# Patient Record
Sex: FEMALE | Race: WHITE | NOT HISPANIC OR LATINO | URBAN - METROPOLITAN AREA
[De-identification: names, ages, dates, MRNs, and addresses within clinical notes are randomized per-mention and may not be internally consistent; named-entity substitution may affect disease eponyms.]

---

## 2023-09-18 ENCOUNTER — HOSPITAL ENCOUNTER (OUTPATIENT)
Dept: DATA CONVERSION | Facility: HOSPITAL | Age: 38
Discharge: HOME | End: 2023-09-18
Payer: COMMERCIAL

## 2023-09-18 DIAGNOSIS — E66.9 OBESITY, UNSPECIFIED: ICD-10-CM

## 2023-09-18 DIAGNOSIS — S83.512A SPRAIN OF ANTERIOR CRUCIATE LIGAMENT OF LEFT KNEE, INITIAL ENCOUNTER: ICD-10-CM

## 2023-09-18 DIAGNOSIS — E03.9 HYPOTHYROIDISM, UNSPECIFIED: ICD-10-CM

## 2023-09-18 LAB
ALBUMIN SERPL-MCNC: 4 GM/DL (ref 3.5–5)
ALBUMIN/GLOB SERPL: 1.4 RATIO (ref 1.5–3)
ALP BLD-CCNC: 70 U/L (ref 35–125)
ALT SERPL-CCNC: 17 U/L (ref 5–40)
ANION GAP SERPL CALCULATED.3IONS-SCNC: 19 MMOL/L (ref 0–19)
AST SERPL-CCNC: 23 U/L (ref 5–40)
BASOPHILS # BLD AUTO: 0.21 K/UL (ref 0–0.22)
BASOPHILS NFR BLD AUTO: 2 % (ref 0–1)
BILIRUB SERPL-MCNC: 0.3 MG/DL (ref 0.1–1.2)
BUN SERPL-MCNC: 10 MG/DL (ref 8–25)
BUN/CREAT SERPL: 14.3 RATIO (ref 8–21)
CALCIUM SERPL-MCNC: 9 MG/DL (ref 8.5–10.4)
CHLORIDE SERPL-SCNC: 104 MMOL/L (ref 97–107)
CO2 SERPL-SCNC: 17 MMOL/L (ref 24–31)
CREAT SERPL-MCNC: 0.7 MG/DL (ref 0.4–1.6)
DACRYOCYTES BLD QL SMEAR: ABNORMAL
DEPRECATED RDW RBC AUTO: 62.6 FL (ref 37–54)
DIFFERENTIAL METHOD BLD: MANUAL DIFF
EOSINOPHIL # BLD AUTO: 0.11 K/UL (ref 0–0.45)
EOSINOPHIL NFR BLD: 1 % (ref 0–3)
ERYTHROCYTE [DISTWIDTH] IN BLOOD BY AUTOMATED COUNT: 15.3 % (ref 11.7–15)
GFR SERPL CREATININE-BSD FRML MDRD: 113 ML/MIN/1.73 M2
GLOBULIN SER-MCNC: 2.8 G/DL (ref 1.9–3.7)
GLUCOSE SERPL-MCNC: 38 MG/DL (ref 65–99)
HCT VFR BLD AUTO: 53.2 % (ref 36–44)
HGB BLD-MCNC: 15.4 GM/DL (ref 12–15)
HYPOCHROMIA BLD QL SMEAR: ABNORMAL
LYMPHOCYTES # BLD AUTO: 3.05 K/UL (ref 1.2–3.2)
LYMPHOCYTES NFR BLD MANUAL: 29 % (ref 20–40)
MACROCYTES BLD QL SMEAR: ABNORMAL
MCH RBC QN AUTO: 31.8 PG (ref 26–34)
MCHC RBC AUTO-ENTMCNC: 28.9 % (ref 31–37)
MCV RBC AUTO: 109.7 FL (ref 80–100)
NEUTROPHILS # BLD AUTO: 4.95 K/UL (ref 1.8–7.7)
NEUTROPHILS # BLD AUTO: 7.64 K/UL
NEUTROPHILS # BLD MANUAL: 7.16 10*3/UL
NEUTS BAND # BLD AUTO: 2.21 K/UL
NEUTS BAND BLD QL AUTO: 21 %
NEUTS SEG NFR BLD: 47 % (ref 50–70)
NRBC BLD-RTO: 0 /100 WBC
PLATELET # BLD AUTO: 225 K/UL (ref 150–450)
PLATELET BLD QL SMEAR: ABNORMAL
PMV BLD AUTO: 10.5 CU (ref 7–12.6)
POIKILOCYTOSIS BLD QL SMEAR: ABNORMAL
POTASSIUM SERPL-SCNC: 4.6 MMOL/L (ref 3.4–5.1)
PROT SERPL-MCNC: 6.8 G/DL (ref 5.9–7.9)
RBC # BLD AUTO: 4.85 M/UL (ref 4–4.9)
RBC MORPH BLD: ABNORMAL
SODIUM SERPL-SCNC: 140 MMOL/L (ref 133–145)
WBC # BLD AUTO: 10.5 K/UL (ref 4.5–11)

## 2023-09-19 LAB — PATH REV BLD -IMP: NORMAL

## 2023-09-22 ENCOUNTER — HOSPITAL ENCOUNTER (OUTPATIENT)
Dept: DATA CONVERSION | Facility: HOSPITAL | Age: 38
Discharge: HOME | End: 2023-09-22
Payer: COMMERCIAL

## 2023-09-22 DIAGNOSIS — Z01.818 ENCOUNTER FOR OTHER PREPROCEDURAL EXAMINATION: ICD-10-CM

## 2023-09-22 LAB
ANION GAP SERPL CALCULATED.3IONS-SCNC: 13 MMOL/L (ref 0–19)
BASOPHILS # BLD AUTO: 0.05 K/UL (ref 0–0.22)
BASOPHILS NFR BLD AUTO: 0.5 % (ref 0–1)
BUN SERPL-MCNC: 11 MG/DL (ref 8–25)
BUN/CREAT SERPL: 15.7 RATIO (ref 8–21)
CALCIUM SERPL-MCNC: 9.1 MG/DL (ref 8.5–10.4)
CHLORIDE SERPL-SCNC: 105 MMOL/L (ref 97–107)
CO2 SERPL-SCNC: 20 MMOL/L (ref 24–31)
CREAT SERPL-MCNC: 0.7 MG/DL (ref 0.4–1.6)
DEPRECATED RDW RBC AUTO: 47 FL (ref 37–54)
DIFFERENTIAL METHOD BLD: ABNORMAL
EOSINOPHIL # BLD AUTO: 0.17 K/UL (ref 0–0.45)
EOSINOPHIL NFR BLD: 1.6 % (ref 0–3)
ERYTHROCYTE [DISTWIDTH] IN BLOOD BY AUTOMATED COUNT: 13.5 % (ref 11.7–15)
GFR SERPL CREATININE-BSD FRML MDRD: 113 ML/MIN/1.73 M2
GLUCOSE SERPL-MCNC: 89 MG/DL (ref 65–99)
HCT VFR BLD AUTO: 46 % (ref 36–44)
HGB BLD-MCNC: 15.5 GM/DL (ref 12–15)
IMM GRANULOCYTES # BLD AUTO: 0.04 K/UL (ref 0–0.1)
LYMPHOCYTES # BLD AUTO: 2.93 K/UL (ref 1.2–3.2)
LYMPHOCYTES NFR BLD MANUAL: 27.8 % (ref 20–40)
MCH RBC QN AUTO: 31.7 PG (ref 26–34)
MCHC RBC AUTO-ENTMCNC: 33.7 % (ref 31–37)
MCV RBC AUTO: 94.1 FL (ref 80–100)
MONOCYTES # BLD AUTO: 0.57 K/UL (ref 0–0.8)
MONOCYTES NFR BLD MANUAL: 5.4 % (ref 0–8)
NEUTROPHILS # BLD AUTO: 6.78 K/UL
NEUTROPHILS # BLD AUTO: 6.78 K/UL (ref 1.8–7.7)
NEUTROPHILS.IMMATURE NFR BLD: 0.4 % (ref 0–1)
NEUTS SEG NFR BLD: 64.3 % (ref 50–70)
NRBC BLD-RTO: 0 /100 WBC
PLATELET # BLD AUTO: 200 K/UL (ref 150–450)
PLATELET BLD QL SMEAR: ABNORMAL
PMV BLD AUTO: 11.7 CU (ref 7–12.6)
POTASSIUM SERPL-SCNC: 4.4 MMOL/L (ref 3.4–5.1)
RBC # BLD AUTO: 4.89 M/UL (ref 4–4.9)
RBC MORPH BLD: ABNORMAL
SODIUM SERPL-SCNC: 138 MMOL/L (ref 133–145)
VIT B12 SERPL-MCNC: 376 PG/ML (ref 211–946)
WBC # BLD AUTO: 10.5 K/UL (ref 4.5–11)
WBC MORPH BLD: ABNORMAL

## 2023-09-27 ENCOUNTER — HOSPITAL ENCOUNTER (OUTPATIENT)
Dept: DATA CONVERSION | Facility: HOSPITAL | Age: 38
Discharge: HOME | End: 2023-09-27
Payer: COMMERCIAL

## 2023-09-27 DIAGNOSIS — E66.9 OBESITY, UNSPECIFIED: ICD-10-CM

## 2023-09-27 DIAGNOSIS — F17.210 NICOTINE DEPENDENCE, CIGARETTES, UNCOMPLICATED: ICD-10-CM

## 2023-09-27 DIAGNOSIS — E03.9 HYPOTHYROIDISM, UNSPECIFIED: ICD-10-CM

## 2023-09-27 DIAGNOSIS — S83.512A SPRAIN OF ANTERIOR CRUCIATE LIGAMENT OF LEFT KNEE, INITIAL ENCOUNTER: ICD-10-CM

## 2023-09-27 LAB — HCG UR QL: NEGATIVE

## 2024-04-12 ENCOUNTER — OFFICE VISIT (OUTPATIENT)
Dept: PRIMARY CARE | Facility: CLINIC | Age: 39
End: 2024-04-12
Payer: COMMERCIAL

## 2024-04-12 VITALS
WEIGHT: 250 LBS | HEIGHT: 63 IN | OXYGEN SATURATION: 97 % | BODY MASS INDEX: 44.3 KG/M2 | HEART RATE: 89 BPM | TEMPERATURE: 97.4 F | DIASTOLIC BLOOD PRESSURE: 70 MMHG | SYSTOLIC BLOOD PRESSURE: 110 MMHG | RESPIRATION RATE: 16 BRPM

## 2024-04-12 DIAGNOSIS — L30.9 CHRONIC DERMATITIS OF HANDS: Primary | ICD-10-CM

## 2024-04-12 DIAGNOSIS — D22.9 NEVUS: ICD-10-CM

## 2024-04-12 DIAGNOSIS — G43.809 OTHER MIGRAINE WITHOUT STATUS MIGRAINOSUS, NOT INTRACTABLE: ICD-10-CM

## 2024-04-12 PROCEDURE — 99214 OFFICE O/P EST MOD 30 MIN: CPT | Performed by: FAMILY MEDICINE

## 2024-04-12 PROCEDURE — 3008F BODY MASS INDEX DOCD: CPT | Performed by: FAMILY MEDICINE

## 2024-04-12 RX ORDER — KETOCONAZOLE 20 MG/G
CREAM TOPICAL 2 TIMES DAILY
Qty: 60 G | Refills: 2 | Status: SHIPPED | OUTPATIENT
Start: 2024-04-12 | End: 2025-04-12

## 2024-04-12 RX ORDER — SUMATRIPTAN SUCCINATE 100 MG/1
100 TABLET ORAL ONCE AS NEEDED
Qty: 9 TABLET | Refills: 5 | Status: SHIPPED | OUTPATIENT
Start: 2024-04-12 | End: 2025-04-12

## 2024-04-12 RX ORDER — ALCLOMETASONE DIPROPIONATE 0.5 MG/G
CREAM TOPICAL 2 TIMES DAILY
Qty: 45 G | Refills: 2 | Status: SHIPPED | OUTPATIENT
Start: 2024-04-12

## 2024-04-12 RX ORDER — LEVONORGESTREL 52 MG/1
INTRAUTERINE DEVICE INTRAUTERINE
COMMUNITY

## 2024-04-12 ASSESSMENT — ENCOUNTER SYMPTOMS
DEPRESSION: 0
MUSCULOSKELETAL NEGATIVE: 1
CARDIOVASCULAR NEGATIVE: 1
LOSS OF SENSATION IN FEET: 0
RESPIRATORY NEGATIVE: 1
NEUROLOGICAL NEGATIVE: 1
CONSTITUTIONAL NEGATIVE: 1
OCCASIONAL FEELINGS OF UNSTEADINESS: 0
GASTROINTESTINAL NEGATIVE: 1

## 2024-04-12 ASSESSMENT — PATIENT HEALTH QUESTIONNAIRE - PHQ9
1. LITTLE INTEREST OR PLEASURE IN DOING THINGS: NOT AT ALL
2. FEELING DOWN, DEPRESSED OR HOPELESS: NOT AT ALL
SUM OF ALL RESPONSES TO PHQ9 QUESTIONS 1 AND 2: 0

## 2024-04-12 ASSESSMENT — COLUMBIA-SUICIDE SEVERITY RATING SCALE - C-SSRS
1. IN THE PAST MONTH, HAVE YOU WISHED YOU WERE DEAD OR WISHED YOU COULD GO TO SLEEP AND NOT WAKE UP?: NO
6. HAVE YOU EVER DONE ANYTHING, STARTED TO DO ANYTHING, OR PREPARED TO DO ANYTHING TO END YOUR LIFE?: NO
2. HAVE YOU ACTUALLY HAD ANY THOUGHTS OF KILLING YOURSELF?: NO

## 2024-04-12 ASSESSMENT — PAIN SCALES - GENERAL: PAINLEVEL: 8

## 2024-04-12 NOTE — PROGRESS NOTES
"Subjective   Patient ID: Shana Pierce is a 38 y.o. female who presents for Mass (Pt is here with complaints of bumps on her left hand and has a mole on her right side of her face and one on her neck that she would like evaluated for removal.).    HPI   Lesion on right cheek has been there for years but somewhat larger recently.  Also left neck unchanged for years.   Review of Systems   Constitutional: Negative.    HENT: Negative.     Respiratory: Negative.     Cardiovascular: Negative.    Gastrointestinal: Negative.    Genitourinary: Negative.    Musculoskeletal: Negative.    Neurological: Negative.        Objective   /70 (BP Location: Left arm, Patient Position: Sitting, BP Cuff Size: Large adult)   Pulse 89   Temp 36.3 °C (97.4 °F) (Temporal)   Resp 16   Ht 1.6 m (5' 3\")   Wt 113 kg (250 lb)   SpO2 97%   BMI 44.29 kg/m²     Physical Exam  Constitutional:       General: She is not in acute distress.     Appearance: Normal appearance.   Cardiovascular:      Rate and Rhythm: Normal rate and regular rhythm.      Heart sounds: No murmur heard.  Pulmonary:      Breath sounds: Normal breath sounds. No wheezing.   Skin:     Comments: Left palm with some peeling and scale.  No maceration or peeling of feet.   Right cheek with 1.2 oval raised pinkish lesion with some brown section of superior portion.   Similar flesh colored lesion of left neck.    Neurological:      Mental Status: She is alert.         Assessment/Plan   Problem List Items Addressed This Visit    None  Visit Diagnoses         Codes    Chronic dermatitis of hands    -  Primary L30.9    Relevant Medications    ketoconazole (NIZOral) 2 % cream    alclometasone (Aclovate) 0.05 % cream    Other migraine without status migrainosus, not intractable     G43.809    Relevant Medications    SUMAtriptan (Imitrex) 100 mg tablet    Nevus     D22.9          Follow up for shave excision near future     "

## 2024-04-16 ENCOUNTER — APPOINTMENT (OUTPATIENT)
Dept: PRIMARY CARE | Facility: CLINIC | Age: 39
End: 2024-04-16
Payer: COMMERCIAL

## 2024-05-06 ENCOUNTER — HOSPITAL ENCOUNTER (EMERGENCY)
Facility: HOSPITAL | Age: 39
Discharge: HOME | End: 2024-05-06
Payer: COMMERCIAL

## 2024-05-06 VITALS
RESPIRATION RATE: 18 BRPM | SYSTOLIC BLOOD PRESSURE: 113 MMHG | DIASTOLIC BLOOD PRESSURE: 74 MMHG | OXYGEN SATURATION: 99 % | HEIGHT: 66 IN | WEIGHT: 239 LBS | HEART RATE: 82 BPM | TEMPERATURE: 98.6 F | BODY MASS INDEX: 38.41 KG/M2

## 2024-05-06 DIAGNOSIS — H02.89 PAIN OF RIGHT EYELID: Primary | ICD-10-CM

## 2024-05-06 PROCEDURE — 99281 EMR DPT VST MAYX REQ PHY/QHP: CPT

## 2024-05-06 ASSESSMENT — COLUMBIA-SUICIDE SEVERITY RATING SCALE - C-SSRS
6. HAVE YOU EVER DONE ANYTHING, STARTED TO DO ANYTHING, OR PREPARED TO DO ANYTHING TO END YOUR LIFE?: NO
1. IN THE PAST MONTH, HAVE YOU WISHED YOU WERE DEAD OR WISHED YOU COULD GO TO SLEEP AND NOT WAKE UP?: NO
2. HAVE YOU ACTUALLY HAD ANY THOUGHTS OF KILLING YOURSELF?: NO

## 2024-05-06 ASSESSMENT — VISUAL ACUITY
OS: 20/20
OU: 20/20
OD: 20/25

## 2024-05-06 NOTE — DISCHARGE INSTRUCTIONS
You were seen in the emergency department today for evaluation of pain to the right eyelid.  Suspect your pain was due to a ingrown hair.  We recommend you continue to use warm compresses as needed to help with any pain or discomfort.  Follow-up with primary care physician outpatient within the next 1 to 2 days.  Return to the emergency department at anytime with any new or worsening symptoms.

## 2024-05-06 NOTE — ED PROVIDER NOTES
HPI   Chief Complaint   Patient presents with    Eye Problem     Stye in right eye. Blurred vision, itching, pain. Started on Thursday, getting worse ever since. Pt is wearing contacts.       Patient is a 38-year-old female presenting to the emergency department for evaluation of bump to the right eye.  Patient states she noticed the bump on Thursday.  She states she thought it was a stye.  She states she has placed warm compresses on the right eyelid with minimal relief in symptoms.  She denies any trauma to her actual eyeball or pain with eye movements.  She denies any change in vision.  She denies any headaches, numbness, tingling.                          [place] Coma Scale Score: 15                     Patient History   Past Medical History:   Diagnosis Date    Bee allergy status 2016    Bee sting allergy    Complex regional pain syndrome I of unspecified upper limb 2015    RSD upper limb    Impaired fasting glucose 2015    Elevated fasting glucose     Past Surgical History:   Procedure Laterality Date     SECTION, CLASSIC  2015     Section    CHOLECYSTECTOMY  2015    Cholecystectomy Laparoscopic     No family history on file.  Social History     Tobacco Use    Smoking status: Every Day     Current packs/day: 0.25     Types: Cigarettes    Smokeless tobacco: Never   Substance Use Topics    Alcohol use: Never    Drug use: Never       Physical Exam   ED Triage Vitals [24 1540]   Temperature Heart Rate Respirations BP   37 °C (98.6 °F) 82 18 113/74      Pulse Ox Temp Source Heart Rate Source Patient Position   99 % Oral -- Sitting      BP Location FiO2 (%)     Left arm --       Physical Exam  Vitals and nursing note reviewed.   Constitutional:       General: She is not in acute distress.     Appearance: Normal appearance. She is not ill-appearing or toxic-appearing.   HENT:      Head: Normocephalic and atraumatic.      Nose: Nose normal.      Mouth/Throat:       Mouth: Mucous membranes are moist.   Eyes:      General:         Right eye: No foreign body, discharge or hordeolum.         Left eye: No foreign body, discharge or hordeolum.      Extraocular Movements: Extraocular movements intact.      Conjunctiva/sclera: Conjunctivae normal.      Right eye: Right conjunctiva is not injected.      Left eye: Left conjunctiva is not injected.      Pupils: Pupils are equal, round, and reactive to light.      Comments: Small papule noted to the right upper eyelid in line with the eyelashes.  There appears to be a white center on the papule with a hair in the middle of the papule.    Musculoskeletal:         General: Normal range of motion.   Skin:     General: Skin is warm and dry.   Neurological:      General: No focal deficit present.      Mental Status: She is alert and oriented to person, place, and time.   Psychiatric:         Mood and Affect: Mood normal.         Behavior: Behavior normal.         ED Course & MDM   Diagnoses as of 05/06/24 1705   Pain of right eyelid       Medical Decision Making  **Disclaimer parts of this chart have been completed using voice recognition software. Please excuse any errors of transcription.     Evaluated this patient independently and my supervising physician was available for consultation.    HPI: Detailed above.    Exam: A medically appropriate exam performed, outlined above, given the known history and presentation.    History obtained from: Patient    EMERGENCY DEPARTMENT COURSE and DIFFERENTIAL DIAGNOSIS/MDM:  Patient is a 38-year-old female presenting to the emergency department for evaluation of bump to the upper right eyelid.  On physical exam vital signs stable patient is in no acute distress.  She has a papule with a central white dot noted to the external upper lid in line with the upper eyelashes which appears to be similar to an ingrown hair.  Pressure was placed on the papule and hair was exposed.  Hair was pulled with tweezers  "and patient got immediate relief in the right eyelid.  She denies any changes in vision.  She has no conjunctival injection or sign of foreign body in the eye.  Suspect most of her pain was coming from the papule on her right eyelid which was an ingrown hair.  She was given bacitracin for the papule as requested.  She was discharged in stable condition.  She will follow-up with primary care physician outpatient within the next 1 to 2 days.  She will return to the emergency department any new or worsening symptoms.      Vitals:    Vitals:    05/06/24 1540   BP: 113/74   BP Location: Left arm   Patient Position: Sitting   Pulse: 82   Resp: 18   Temp: 37 °C (98.6 °F)   TempSrc: Oral   SpO2: 99%   Weight: 108 kg (239 lb)   Height: 1.676 m (5' 6\")     History Limited by:    None    Independent history obtained from:    None      External records reviewed:    None      Diagnostics interpreted by me:    None      Discussions with other clinicians:    None      Chronic conditions impacting care:    None      Social determinants of health affecting care:    None    Diagnostic tests considered but not performed: None    ED Medications managed:    Medications - No data to display      Prescription drugs considered:    None        Procedure  Procedures     Tammie Gates PA-C  05/06/24 1705    "

## 2024-05-09 ENCOUNTER — OFFICE VISIT (OUTPATIENT)
Dept: PRIMARY CARE | Facility: CLINIC | Age: 39
End: 2024-05-09
Payer: COMMERCIAL

## 2024-05-09 VITALS
WEIGHT: 248 LBS | BODY MASS INDEX: 41.32 KG/M2 | DIASTOLIC BLOOD PRESSURE: 86 MMHG | OXYGEN SATURATION: 99 % | RESPIRATION RATE: 16 BRPM | HEART RATE: 94 BPM | HEIGHT: 65 IN | SYSTOLIC BLOOD PRESSURE: 118 MMHG | TEMPERATURE: 97.3 F

## 2024-05-09 DIAGNOSIS — Z00.00 ROUTINE GENERAL MEDICAL EXAMINATION AT A HEALTH CARE FACILITY: Primary | ICD-10-CM

## 2024-05-09 DIAGNOSIS — Z72.0 TOBACCO ABUSE: ICD-10-CM

## 2024-05-09 DIAGNOSIS — J45.909 UNCOMPLICATED ASTHMA, UNSPECIFIED ASTHMA SEVERITY, UNSPECIFIED WHETHER PERSISTENT (HHS-HCC): ICD-10-CM

## 2024-05-09 DIAGNOSIS — E66.01 CLASS 3 SEVERE OBESITY DUE TO EXCESS CALORIES WITHOUT SERIOUS COMORBIDITY WITH BODY MASS INDEX (BMI) OF 40.0 TO 44.9 IN ADULT (MULTI): ICD-10-CM

## 2024-05-09 DIAGNOSIS — Z01.818 PREOPERATIVE EXAMINATION: ICD-10-CM

## 2024-05-09 LAB
ALBUMIN SERPL-MCNC: 4 G/DL (ref 3.5–5)
ALP BLD-CCNC: 88 U/L (ref 35–125)
ALT SERPL-CCNC: 14 U/L (ref 5–40)
ANION GAP SERPL CALC-SCNC: 14 MMOL/L
AST SERPL-CCNC: 11 U/L (ref 5–40)
BASOPHILS # BLD AUTO: 0.04 X10*3/UL (ref 0–0.1)
BASOPHILS NFR BLD AUTO: 0.5 %
BILIRUB SERPL-MCNC: 0.2 MG/DL (ref 0.1–1.2)
BUN SERPL-MCNC: 14 MG/DL (ref 8–25)
CALCIUM SERPL-MCNC: 8.9 MG/DL (ref 8.5–10.4)
CHLORIDE SERPL-SCNC: 108 MMOL/L (ref 97–107)
CHOLEST SERPL-MCNC: 188 MG/DL (ref 133–200)
CHOLEST/HDLC SERPL: 4.9 {RATIO}
CO2 SERPL-SCNC: 21 MMOL/L (ref 24–31)
CREAT SERPL-MCNC: 0.7 MG/DL (ref 0.4–1.6)
EGFRCR SERPLBLD CKD-EPI 2021: >90 ML/MIN/1.73M*2
EOSINOPHIL # BLD AUTO: 0.18 X10*3/UL (ref 0–0.7)
EOSINOPHIL NFR BLD AUTO: 2.2 %
ERYTHROCYTE [DISTWIDTH] IN BLOOD BY AUTOMATED COUNT: 13.2 % (ref 11.5–14.5)
GLUCOSE SERPL-MCNC: 108 MG/DL (ref 65–99)
HCT VFR BLD AUTO: 45.8 % (ref 36–46)
HDLC SERPL-MCNC: 38 MG/DL
HGB BLD-MCNC: 15 G/DL (ref 12–16)
IMM GRANULOCYTES # BLD AUTO: 0.02 X10*3/UL (ref 0–0.7)
IMM GRANULOCYTES NFR BLD AUTO: 0.2 % (ref 0–0.9)
LDLC SERPL CALC-MCNC: 132 MG/DL (ref 65–130)
LYMPHOCYTES # BLD AUTO: 1.98 X10*3/UL (ref 1.2–4.8)
LYMPHOCYTES NFR BLD AUTO: 24.4 %
MCH RBC QN AUTO: 31.6 PG (ref 26–34)
MCHC RBC AUTO-ENTMCNC: 32.8 G/DL (ref 32–36)
MCV RBC AUTO: 97 FL (ref 80–100)
MONOCYTES # BLD AUTO: 0.46 X10*3/UL (ref 0.1–1)
MONOCYTES NFR BLD AUTO: 5.7 %
NEUTROPHILS # BLD AUTO: 5.45 X10*3/UL (ref 1.2–7.7)
NEUTROPHILS NFR BLD AUTO: 67 %
NRBC BLD-RTO: 0 /100 WBCS (ref 0–0)
PLATELET # BLD AUTO: 218 X10*3/UL (ref 150–450)
POTASSIUM SERPL-SCNC: 4.3 MMOL/L (ref 3.4–5.1)
PROT SERPL-MCNC: 6.5 G/DL (ref 5.9–7.9)
RBC # BLD AUTO: 4.74 X10*6/UL (ref 4–5.2)
SODIUM SERPL-SCNC: 143 MMOL/L (ref 133–145)
TRIGL SERPL-MCNC: 90 MG/DL (ref 40–150)
TSH SERPL DL<=0.05 MIU/L-ACNC: 3.39 MIU/L (ref 0.27–4.2)
WBC # BLD AUTO: 8.1 X10*3/UL (ref 4.4–11.3)

## 2024-05-09 PROCEDURE — 85025 COMPLETE CBC W/AUTO DIFF WBC: CPT | Performed by: FAMILY MEDICINE

## 2024-05-09 PROCEDURE — 36415 COLL VENOUS BLD VENIPUNCTURE: CPT | Performed by: FAMILY MEDICINE

## 2024-05-09 PROCEDURE — 99395 PREV VISIT EST AGE 18-39: CPT | Performed by: FAMILY MEDICINE

## 2024-05-09 PROCEDURE — 99213 OFFICE O/P EST LOW 20 MIN: CPT | Performed by: FAMILY MEDICINE

## 2024-05-09 PROCEDURE — 80053 COMPREHEN METABOLIC PANEL: CPT | Performed by: FAMILY MEDICINE

## 2024-05-09 PROCEDURE — 84443 ASSAY THYROID STIM HORMONE: CPT | Performed by: FAMILY MEDICINE

## 2024-05-09 PROCEDURE — 3008F BODY MASS INDEX DOCD: CPT | Performed by: FAMILY MEDICINE

## 2024-05-09 PROCEDURE — 80061 LIPID PANEL: CPT | Performed by: FAMILY MEDICINE

## 2024-05-09 RX ORDER — VARENICLINE TARTRATE 0.5 (11)-1
KIT ORAL
Qty: 53 EACH | Refills: 0 | Status: SHIPPED | OUTPATIENT
Start: 2024-05-09 | End: 2024-06-08

## 2024-05-09 ASSESSMENT — PROMIS GLOBAL HEALTH SCALE
RATE_QUALITY_OF_LIFE: GOOD
CARRYOUT_PHYSICAL_ACTIVITIES: MODERATELY
RATE_SOCIAL_SATISFACTION: GOOD
RATE_GENERAL_HEALTH: GOOD
CARRYOUT_SOCIAL_ACTIVITIES: GOOD
RATE_AVERAGE_PAIN: 8
EMOTIONAL_PROBLEMS: SOMETIMES
RATE_MENTAL_HEALTH: GOOD
RATE_PHYSICAL_HEALTH: GOOD
RATE_AVERAGE_FATIGUE: MODERATE

## 2024-05-09 ASSESSMENT — ENCOUNTER SYMPTOMS
OCCASIONAL FEELINGS OF UNSTEADINESS: 0
LOSS OF SENSATION IN FEET: 0
CARDIOVASCULAR NEGATIVE: 1
CONSTITUTIONAL NEGATIVE: 1
NEUROLOGICAL NEGATIVE: 1
MUSCULOSKELETAL NEGATIVE: 1
DEPRESSION: 0
GASTROINTESTINAL NEGATIVE: 1
RESPIRATORY NEGATIVE: 1

## 2024-05-09 ASSESSMENT — PAIN SCALES - GENERAL: PAINLEVEL: 8

## 2024-05-09 ASSESSMENT — PATIENT HEALTH QUESTIONNAIRE - PHQ9
SUM OF ALL RESPONSES TO PHQ9 QUESTIONS 1 AND 2: 0
1. LITTLE INTEREST OR PLEASURE IN DOING THINGS: NOT AT ALL
2. FEELING DOWN, DEPRESSED OR HOPELESS: NOT AT ALL

## 2024-05-09 NOTE — PROGRESS NOTES
"Subjective   Patient ID: Shana Pierce is a 38 y.o. female who presents for Annual Exam and Pre-op Exam (Pt is here for pe and fbw. Pt also needs surgical clearance for left knee scope.).    HPI she is having left knee arthroscopy on 5.29.  she had acl repair of this knee in Sept of 2023.  Still with pain.    She continues to smoke  Review of Systems   Constitutional: Negative.    HENT: Negative.     Respiratory: Negative.     Cardiovascular: Negative.    Gastrointestinal: Negative.    Genitourinary: Negative.    Musculoskeletal: Negative.    Neurological: Negative.        Objective   /86 (BP Location: Left arm, Patient Position: Sitting, BP Cuff Size: Adult)   Pulse 94   Temp 36.3 °C (97.3 °F) (Temporal)   Resp 16   Ht 1.651 m (5' 5\")   Wt 112 kg (248 lb)   SpO2 99%   BMI 41.27 kg/m²     Physical Exam  Vitals and nursing note reviewed.   Constitutional:       General: She is not in acute distress.  HENT:      Right Ear: Tympanic membrane and ear canal normal.      Left Ear: Tympanic membrane and ear canal normal.      Nose: Nose normal. No rhinorrhea.      Mouth/Throat:      Pharynx: Oropharynx is clear. No oropharyngeal exudate or posterior oropharyngeal erythema.      Comments: Dentition wnl  Eyes:      Extraocular Movements: Extraocular movements intact.      Conjunctiva/sclera: Conjunctivae normal.      Pupils: Pupils are equal, round, and reactive to light.   Neck:      Vascular: No carotid bruit.   Cardiovascular:      Rate and Rhythm: Normal rate and regular rhythm.      Heart sounds: Normal heart sounds. No murmur heard.  Pulmonary:      Breath sounds: Normal breath sounds. No wheezing or rhonchi.   Abdominal:      General: Bowel sounds are normal. There is no distension.      Palpations: Abdomen is soft. There is no mass.      Tenderness: There is no abdominal tenderness. There is no guarding or rebound.      Hernia: No hernia is present.   Musculoskeletal:         General: No swelling or " tenderness. Normal range of motion.      Cervical back: Normal range of motion and neck supple.   Lymphadenopathy:      Cervical: No cervical adenopathy.   Skin:     General: Skin is warm.      Findings: No rash.   Neurological:      General: No focal deficit present.      Mental Status: She is alert.         Assessment/Plan   Problem List Items Addressed This Visit             ICD-10-CM    Uncomplicated asthma, unspecified asthma severity, unspecified whether persistent ([facility]) J45.909     Other Visit Diagnoses         Codes    Routine general medical examination at a health care facility    -  Primary Z00.00    Relevant Orders    CBC and Auto Differential    Comprehensive Metabolic Panel    TSH with reflex to Free T4 if abnormal    Lipid Panel    Preoperative examination     Z01.818    Relevant Orders    CBC and Auto Differential    Comprehensive Metabolic Panel    TSH with reflex to Free T4 if abnormal    Lipid Panel    Class 3 severe obesity due to excess calories without serious comorbidity with body mass index (BMI) of 40.0 to 44.9 in adult (Multi)     E66.01, Z68.41    Relevant Orders    CBC and Auto Differential    Comprehensive Metabolic Panel    TSH with reflex to Free T4 if abnormal    Lipid Panel    Tobacco abuse     Z72.0    Relevant Medications    varenicline (Chantix Starting Month Box) 0.5 mg (11)- 1 mg (42) tablet        Disc better diet/increased activity  Discussed smoking cessation and rx Chantix given and discussed.

## 2024-05-22 ENCOUNTER — PRE-ADMISSION TESTING (OUTPATIENT)
Dept: PREADMISSION TESTING | Facility: HOSPITAL | Age: 39
End: 2024-05-22
Payer: COMMERCIAL

## 2024-05-22 ENCOUNTER — ANESTHESIA EVENT (OUTPATIENT)
Dept: OPERATING ROOM | Facility: HOSPITAL | Age: 39
End: 2024-05-22
Payer: COMMERCIAL

## 2024-05-22 VITALS — WEIGHT: 239 LBS | HEIGHT: 66 IN | BODY MASS INDEX: 38.41 KG/M2

## 2024-05-22 DIAGNOSIS — Z29.9 PROPHYLACTIC MEASURE: Primary | ICD-10-CM

## 2024-05-22 PROBLEM — E66.9 CLASS 2 OBESITY IN ADULT: Status: ACTIVE | Noted: 2024-05-22

## 2024-05-22 PROBLEM — Z98.890 PONV (POSTOPERATIVE NAUSEA AND VOMITING): Status: ACTIVE | Noted: 2024-05-22

## 2024-05-22 PROBLEM — E66.812 CLASS 2 OBESITY IN ADULT: Status: ACTIVE | Noted: 2024-05-22

## 2024-05-22 PROBLEM — T88.59XA DELAYED EMERGENCE FROM ANESTHESIA: Status: ACTIVE | Noted: 2024-05-22

## 2024-05-22 PROBLEM — Z86.69 HISTORY OF MIGRAINE HEADACHES: Status: ACTIVE | Noted: 2024-05-22

## 2024-05-22 PROBLEM — R11.2 PONV (POSTOPERATIVE NAUSEA AND VOMITING): Status: ACTIVE | Noted: 2024-05-22

## 2024-05-22 RX ORDER — CHLORHEXIDINE GLUCONATE 40 MG/ML
SOLUTION TOPICAL
Qty: 473 ML | Refills: 0 | Status: SHIPPED | OUTPATIENT
Start: 2024-05-22 | End: 2024-05-24 | Stop reason: HOSPADM

## 2024-05-22 ASSESSMENT — DUKE ACTIVITY SCORE INDEX (DASI)
CAN YOU WALK A BLOCK OR TWO ON LEVEL GROUND: YES
CAN YOU WALK INDOORS, SUCH AS AROUND YOUR HOUSE: YES
CAN YOU RUN A SHORT DISTANCE: NO
CAN YOU HAVE SEXUAL RELATIONS: YES
CAN YOU DO LIGHT WORK AROUND THE HOUSE LIKE DUSTING OR WASHING DISHES: YES
CAN YOU DO HEAVY WORK AROUND THE HOUSE LIKE SCRUBBING FLOORS OR LIFTING AND MOVING HEAVY FURNITURE: YES
CAN YOU PARTICIPATE IN STRENOUS SPORTS LIKE SWIMMING, SINGLES TENNIS, FOOTBALL, BASKETBALL, OR SKIING: NO
TOTAL_SCORE: 42.7
CAN YOU CLIMB A FLIGHT OF STAIRS OR WALK UP A HILL: YES
CAN YOU TAKE CARE OF YOURSELF (EAT, DRESS, BATHE, OR USE TOILET): YES
CAN YOU DO MODERATE WORK AROUND THE HOUSE LIKE VACUUMING, SWEEPING FLOORS OR CARRYING GROCERIES: YES
CAN YOU DO YARD WORK LIKE RAKING LEAVES, WEEDING OR PUSHING A MOWER: YES
DASI METS SCORE: 8
CAN YOU PARTICIPATE IN MODERATE RECREATIONAL ACTIVITIES LIKE GOLF, BOWLING, DANCING, DOUBLES TENNIS OR THROWING A BASEBALL OR FOOTBALL: YES

## 2024-05-22 NOTE — PREPROCEDURE INSTRUCTIONS
Medication List            Accurate as of May 22, 2024  8:23 AM. Always use your most recent med list.                alclometasone 0.05 % cream  Commonly known as: Aclovate  Apply topically 2 times a day.  Medication Adjustments for Surgery: Continue until night before surgery     chlorhexidine 4 % external liquid  Commonly known as: Hibiclens  Apply to entire body focusing on surgical area, avoiding genitals, wash an rinse thoroughly for 2 days leading up to surgery and morning of  Medication Adjustments for Surgery: Take morning of surgery with sip of water, no other fluids     ketoconazole 2 % cream  Commonly known as: NIZOral  Apply topically 2 times a day.  Medication Adjustments for Surgery: Continue until night before surgery     Mirena 21 mcg/24 hr (8 yrs) 52 mg IUD  Generic drug: levonorgestrel     SUMAtriptan 100 mg tablet  Commonly known as: Imitrex  Take 1 tablet (100 mg) by mouth 1 time if needed for migraine.  Medication Adjustments for Surgery: Continue until night before surgery     varenicline 0.5 mg (11)- 1 mg (42) tablet  Commonly known as: Chantix Starting Month Box  Take 0.5 mg by mouth 2 times a day AND 1 mg 2 times a day.  Medication Adjustments for Surgery: Continue until night before surgery                              NPO Instructions:    Do not eat any food after midnight the night before your surgery/procedure.  May have clears up to 3 hours preop. Water, Black coffee, tea, gatorade, and clear soda. Then nothing by mouth 3 hours prior to procedure. No gum, candy, mints or smoking.      Additional Instructions:     Review your medication instructions, take indicated medications  Wear  comfortable loose fitting clothing  All jewelry and valuables should be left at home    Park in back of hospital by ER. Come up to Second floor-Outpt dept to check in.  Bring Photo ID and Insurance card,   You MUST have a  with you.  No more than 2 visitors with you please.      If you get ill at  all before your procedure- CALL YOUR DOCTOR/SURGEON.  We want you in the best shape that is possible. Any sickness might lead to your procedure being delayed.      Call Outpatient dept at [phone] the night before your procedure (Friday for Monday procedure), between 1-3 pm.     Chlorhexidine soap was called into your pharmacy- use it Thurs (5/23 and 5/24) and Friday morning. Instruction sheet will be emailed to you.

## 2024-05-22 NOTE — ANESTHESIA PREPROCEDURE EVALUATION
Patient: Shana Pierce    Procedure Information       Date/Time: 24 1000    Procedure: Arthroscopy Knee (Left: Knee)    Location: [facility] / [facility]    Surgeons: Elias Weathers, DO            Relevant Problems   Anesthesia   (+) Delayed emergence from anesthesia   (+) PONV (postoperative nausea and vomiting)      Cardiac (within normal limits)      Pulmonary   (+) Uncomplicated asthma, unspecified asthma severity, unspecified whether persistent ([facility])      Neuro   (+) History of migraine headaches      GI (within normal limits)      /Renal (within normal limits)      Liver (within normal limits)      Endocrine   (+) Class 2 obesity in adult      Hematology (within normal limits)      Musculoskeletal (within normal limits)      HEENT (within normal limits)      ID (within normal limits)      Skin (within normal limits)      GYN (within normal limits)       Clinical information reviewed:   Tobacco  Allergies  Meds   Med Hx  Surg Hx              Chart reviewed. No clearances ordered.    There were no vitals filed for this visit.    Past Surgical History:   Procedure Laterality Date     SECTION, CLASSIC  2015     Section    CHOLECYSTECTOMY  2015    Cholecystectomy Laparoscopic    KNEE SURGERY Left 2023    pt had acl repair     Past Medical History:   Diagnosis Date    Asthma ([facility])     Bee allergy status 2016    Bee sting allergy    Complex regional pain syndrome I of unspecified upper limb 2015    RSD upper limb    Delayed emergence from general anesthesia     Impaired fasting glucose 2015    Elevated fasting glucose    PONV (postoperative nausea and vomiting)        Current Outpatient Medications:     alclometasone (Aclovate) 0.05 % cream, Apply topically 2 times a day., Disp: 45 g, Rfl: 2    ketoconazole (NIZOral) 2 % cream, Apply topically 2 times a day., Disp: 60 g, Rfl: 2    SUMAtriptan (Imitrex) 100 mg tablet, Take 1 tablet (100  mg) by mouth 1 time if needed for migraine., Disp: 9 tablet, Rfl: 5    chlorhexidine (Hibiclens) 4 % external liquid, Apply to entire body focusing on surgical area, avoiding genitals, wash an rinse thoroughly for 2 days leading up to surgery and morning of, Disp: 473 mL, Rfl: 0    levonorgestrel (Mirena) 21 mcg/24 hours (8 yrs) 52 mg IUD, , Disp: , Rfl:     varenicline (Chantix Starting Month Box) 0.5 mg (11)- 1 mg (42) tablet, Take 0.5 mg by mouth 2 times a day AND 1 mg 2 times a day., Disp: 53 each, Rfl: 0  Prior to Admission medications    Medication Sig Start Date End Date Taking? Authorizing Provider   alclometasone (Aclovate) 0.05 % cream Apply topically 2 times a day. 4/12/24  Yes Russ Lopez, DO   ketoconazole (NIZOral) 2 % cream Apply topically 2 times a day. 4/12/24 4/12/25 Yes Russ Lopez DO   SUMAtriptan (Imitrex) 100 mg tablet Take 1 tablet (100 mg) by mouth 1 time if needed for migraine. 4/12/24 4/12/25 Yes Russ Lopez,    chlorhexidine (Hibiclens) 4 % external liquid Apply to entire body focusing on surgical area, avoiding genitals, wash an rinse thoroughly for 2 days leading up to surgery and morning of 5/22/24   Nathaniel Flores PA-C   levonorgestrel (Mirena) 21 mcg/24 hours (8 yrs) 52 mg IUD     Historical Provider, MD   varenicline (Chantix Starting Month Box) 0.5 mg (11)- 1 mg (42) tablet Take 0.5 mg by mouth 2 times a day AND 1 mg 2 times a day. 5/9/24 6/8/24  Russ Lopez DO     Allergies   Allergen Reactions    Penicillins Rash and Hives    Cortisone Swelling    Latex Rash     Social History     Tobacco Use    Smoking status: Every Day     Current packs/day: 0.25     Average packs/day: 0.3 packs/day for 24.4 years (6.1 ttl pk-yrs)     Types: Cigarettes     Start date: 2000    Smokeless tobacco: Never   Substance Use Topics    Alcohol use: Never         Chemistry    Lab Results   Component Value Date/Time     05/09/2024 0800    K 4.3 05/09/2024 0800     (H)  "05/09/2024 0800    CO2 21 (L) 05/09/2024 0800    BUN 14 05/09/2024 0800    CREATININE 0.70 05/09/2024 0800    Lab Results   Component Value Date/Time    CALCIUM 8.9 05/09/2024 0800    ALKPHOS 88 05/09/2024 0800    AST 11 05/09/2024 0800    ALT 14 05/09/2024 0800    BILITOT 0.2 05/09/2024 0800          Lab Results   Component Value Date/Time    WBC 8.1 05/09/2024 0800    HGB 15.0 05/09/2024 0800    HCT 45.8 05/09/2024 0800     05/09/2024 0800     No results found for: \"PROTIME\", \"PTT\", \"INR\"  No results found for this or any previous visit (from the past 4464 hour(s)).  No results found for this or any previous visit from the past 1095 days.    NPO Detail:  No data recorded     Physical Exam    Airway  Mallampati: III  TM distance: >3 FB  Neck ROM: full     Cardiovascular - normal exam  Rhythm: regular  Rate: normal     Dental     Comments: Poor dentition   Pulmonary - normal exam     Abdominal - normal exam  (+) obese             Anesthesia Plan    History of general anesthesia?: yes  History of complications of general anesthesia?: yes    ASA 2     general   (Regional block post-op if needed)  The patient is a current smoker.  Education provided regarding risk of obstructive sleep apnea.  intravenous induction   Anesthetic plan and risks discussed with patient.  Use of blood products discussed with patient who.    Plan discussed with attending.      "

## 2024-05-24 ENCOUNTER — ANESTHESIA (OUTPATIENT)
Dept: OPERATING ROOM | Facility: HOSPITAL | Age: 39
End: 2024-05-24
Payer: COMMERCIAL

## 2024-05-24 ENCOUNTER — HOSPITAL ENCOUNTER (OUTPATIENT)
Facility: HOSPITAL | Age: 39
Setting detail: OUTPATIENT SURGERY
Discharge: HOME | End: 2024-05-24
Attending: ORTHOPAEDIC SURGERY | Admitting: ORTHOPAEDIC SURGERY
Payer: COMMERCIAL

## 2024-05-24 VITALS
SYSTOLIC BLOOD PRESSURE: 120 MMHG | HEIGHT: 66 IN | TEMPERATURE: 98.1 F | RESPIRATION RATE: 16 BRPM | HEART RATE: 81 BPM | DIASTOLIC BLOOD PRESSURE: 75 MMHG | WEIGHT: 239 LBS | OXYGEN SATURATION: 96 % | BODY MASS INDEX: 38.41 KG/M2

## 2024-05-24 LAB — HCG UR QL IA.RAPID: NEGATIVE

## 2024-05-24 PROCEDURE — 81025 URINE PREGNANCY TEST: CPT | Performed by: PHYSICIAN ASSISTANT

## 2024-05-24 PROCEDURE — 3700000002 HC GENERAL ANESTHESIA TIME - EACH INCREMENTAL 1 MINUTE: Performed by: ORTHOPAEDIC SURGERY

## 2024-05-24 PROCEDURE — 29870 ARTHRS KNEE DX W/WO SYN BX: CPT

## 2024-05-24 PROCEDURE — 2720000007 HC OR 272 NO HCPCS: Performed by: ORTHOPAEDIC SURGERY

## 2024-05-24 PROCEDURE — 2500000005 HC RX 250 GENERAL PHARMACY W/O HCPCS: Performed by: ORTHOPAEDIC SURGERY

## 2024-05-24 PROCEDURE — 3600000009 HC OR TIME - EACH INCREMENTAL 1 MINUTE - PROCEDURE LEVEL FOUR: Performed by: ORTHOPAEDIC SURGERY

## 2024-05-24 PROCEDURE — 2500000004 HC RX 250 GENERAL PHARMACY W/ HCPCS (ALT 636 FOR OP/ED)

## 2024-05-24 PROCEDURE — 7100000002 HC RECOVERY ROOM TIME - EACH INCREMENTAL 1 MINUTE: Performed by: ORTHOPAEDIC SURGERY

## 2024-05-24 PROCEDURE — 7100000009 HC PHASE TWO TIME - INITIAL BASE CHARGE: Performed by: ORTHOPAEDIC SURGERY

## 2024-05-24 PROCEDURE — 3700000001 HC GENERAL ANESTHESIA TIME - INITIAL BASE CHARGE: Performed by: ORTHOPAEDIC SURGERY

## 2024-05-24 PROCEDURE — 7100000001 HC RECOVERY ROOM TIME - INITIAL BASE CHARGE: Performed by: ORTHOPAEDIC SURGERY

## 2024-05-24 PROCEDURE — 2500000004 HC RX 250 GENERAL PHARMACY W/ HCPCS (ALT 636 FOR OP/ED): Performed by: NURSE ANESTHETIST, CERTIFIED REGISTERED

## 2024-05-24 PROCEDURE — 3600000004 HC OR TIME - INITIAL BASE CHARGE - PROCEDURE LEVEL FOUR: Performed by: ORTHOPAEDIC SURGERY

## 2024-05-24 PROCEDURE — 2500000004 HC RX 250 GENERAL PHARMACY W/ HCPCS (ALT 636 FOR OP/ED): Performed by: PHYSICIAN ASSISTANT

## 2024-05-24 PROCEDURE — 7100000010 HC PHASE TWO TIME - EACH INCREMENTAL 1 MINUTE: Performed by: ORTHOPAEDIC SURGERY

## 2024-05-24 PROCEDURE — 2500000002 HC RX 250 W HCPCS SELF ADMINISTERED DRUGS (ALT 637 FOR MEDICARE OP, ALT 636 FOR OP/ED)

## 2024-05-24 PROCEDURE — 2500000001 HC RX 250 WO HCPCS SELF ADMINISTERED DRUGS (ALT 637 FOR MEDICARE OP): Performed by: PHYSICIAN ASSISTANT

## 2024-05-24 PROCEDURE — 2500000005 HC RX 250 GENERAL PHARMACY W/O HCPCS: Performed by: NURSE ANESTHETIST, CERTIFIED REGISTERED

## 2024-05-24 RX ORDER — APREPITANT 40 MG/1
40 CAPSULE ORAL ONCE
Status: COMPLETED | OUTPATIENT
Start: 2024-05-24 | End: 2024-05-24

## 2024-05-24 RX ORDER — PROPOFOL 10 MG/ML
INJECTION, EMULSION INTRAVENOUS AS NEEDED
Status: DISCONTINUED | OUTPATIENT
Start: 2024-05-24 | End: 2024-05-24

## 2024-05-24 RX ORDER — GABAPENTIN 300 MG/1
300 CAPSULE ORAL ONCE
Status: COMPLETED | OUTPATIENT
Start: 2024-05-24 | End: 2024-05-24

## 2024-05-24 RX ORDER — ONDANSETRON HYDROCHLORIDE 2 MG/ML
4 INJECTION, SOLUTION INTRAVENOUS ONCE AS NEEDED
Status: COMPLETED | OUTPATIENT
Start: 2024-05-24 | End: 2024-05-24

## 2024-05-24 RX ORDER — VANCOMYCIN 1.5 G/300ML
1500 INJECTION, SOLUTION INTRAVENOUS ONCE
Status: DISCONTINUED | OUTPATIENT
Start: 2024-05-24 | End: 2024-05-24 | Stop reason: HOSPADM

## 2024-05-24 RX ORDER — MIDAZOLAM HYDROCHLORIDE 1 MG/ML
INJECTION INTRAMUSCULAR; INTRAVENOUS AS NEEDED
Status: DISCONTINUED | OUTPATIENT
Start: 2024-05-24 | End: 2024-05-24

## 2024-05-24 RX ORDER — SODIUM CHLORIDE, SODIUM LACTATE, POTASSIUM CHLORIDE, CALCIUM CHLORIDE 600; 310; 30; 20 MG/100ML; MG/100ML; MG/100ML; MG/100ML
30 INJECTION, SOLUTION INTRAVENOUS CONTINUOUS
Status: DISCONTINUED | OUTPATIENT
Start: 2024-05-24 | End: 2024-05-24 | Stop reason: HOSPADM

## 2024-05-24 RX ORDER — BACITRACIN ZINC 500 UNIT/G
OINTMENT IN PACKET (EA) TOPICAL AS NEEDED
Status: DISCONTINUED | OUTPATIENT
Start: 2024-05-24 | End: 2024-05-24 | Stop reason: HOSPADM

## 2024-05-24 RX ORDER — OXYCODONE HYDROCHLORIDE 5 MG/1
10 TABLET ORAL EVERY 4 HOURS PRN
Status: DISCONTINUED | OUTPATIENT
Start: 2024-05-24 | End: 2024-05-24 | Stop reason: HOSPADM

## 2024-05-24 RX ORDER — VANCOMYCIN HYDROCHLORIDE 1 G/20ML
1.5 INJECTION, POWDER, LYOPHILIZED, FOR SOLUTION INTRAVENOUS ONCE
Status: DISCONTINUED | OUTPATIENT
Start: 2024-05-24 | End: 2024-05-24

## 2024-05-24 RX ORDER — ACETAMINOPHEN 325 MG/1
650 TABLET ORAL EVERY 4 HOURS PRN
Status: DISCONTINUED | OUTPATIENT
Start: 2024-05-24 | End: 2024-05-24 | Stop reason: HOSPADM

## 2024-05-24 RX ORDER — CELECOXIB 100 MG/1
400 CAPSULE ORAL ONCE
Status: COMPLETED | OUTPATIENT
Start: 2024-05-24 | End: 2024-05-24

## 2024-05-24 RX ORDER — PHENYLPROPANOLAMINE/CLEMASTINE 75-1.34MG
600 TABLET, EXTENDED RELEASE ORAL AS NEEDED
COMMUNITY

## 2024-05-24 RX ORDER — VANCOMYCIN HYDROCHLORIDE 1 G/20ML
INJECTION, POWDER, LYOPHILIZED, FOR SOLUTION INTRAVENOUS AS NEEDED
Status: DISCONTINUED | OUTPATIENT
Start: 2024-05-24 | End: 2024-05-24

## 2024-05-24 RX ORDER — SCOLOPAMINE TRANSDERMAL SYSTEM 1 MG/1
1 PATCH, EXTENDED RELEASE TRANSDERMAL
Status: DISCONTINUED | OUTPATIENT
Start: 2024-05-24 | End: 2024-05-24 | Stop reason: HOSPADM

## 2024-05-24 RX ORDER — LIDOCAINE HYDROCHLORIDE 20 MG/ML
INJECTION, SOLUTION EPIDURAL; INFILTRATION; INTRACAUDAL; PERINEURAL AS NEEDED
Status: DISCONTINUED | OUTPATIENT
Start: 2024-05-24 | End: 2024-05-24

## 2024-05-24 RX ORDER — OXYCODONE HYDROCHLORIDE 5 MG/1
5 TABLET ORAL EVERY 4 HOURS PRN
Status: DISCONTINUED | OUTPATIENT
Start: 2024-05-24 | End: 2024-05-24 | Stop reason: HOSPADM

## 2024-05-24 RX ORDER — ONDANSETRON HYDROCHLORIDE 2 MG/ML
INJECTION, SOLUTION INTRAVENOUS AS NEEDED
Status: DISCONTINUED | OUTPATIENT
Start: 2024-05-24 | End: 2024-05-24

## 2024-05-24 RX ORDER — BUPIVACAINE HCL/EPINEPHRINE 0.5-1:200K
VIAL (ML) INJECTION AS NEEDED
Status: DISCONTINUED | OUTPATIENT
Start: 2024-05-24 | End: 2024-05-24 | Stop reason: HOSPADM

## 2024-05-24 RX ORDER — SODIUM CHLORIDE, SODIUM LACTATE, POTASSIUM CHLORIDE, CALCIUM CHLORIDE 600; 310; 30; 20 MG/100ML; MG/100ML; MG/100ML; MG/100ML
100 INJECTION, SOLUTION INTRAVENOUS CONTINUOUS
Status: DISCONTINUED | OUTPATIENT
Start: 2024-05-24 | End: 2024-05-24 | Stop reason: HOSPADM

## 2024-05-24 RX ORDER — FENTANYL CITRATE 50 UG/ML
INJECTION, SOLUTION INTRAMUSCULAR; INTRAVENOUS AS NEEDED
Status: DISCONTINUED | OUTPATIENT
Start: 2024-05-24 | End: 2024-05-24

## 2024-05-24 RX ADMIN — APREPITANT 40 MG: 40 CAPSULE ORAL at 09:13

## 2024-05-24 RX ADMIN — GABAPENTIN 300 MG: 300 CAPSULE ORAL at 09:04

## 2024-05-24 RX ADMIN — SODIUM CHLORIDE, POTASSIUM CHLORIDE, SODIUM LACTATE AND CALCIUM CHLORIDE 30 ML/HR: 600; 310; 30; 20 INJECTION, SOLUTION INTRAVENOUS at 09:05

## 2024-05-24 RX ADMIN — CELECOXIB 400 MG: 100 CAPSULE ORAL at 09:04

## 2024-05-24 RX ADMIN — SCOPALAMINE 1 PATCH: 1 PATCH, EXTENDED RELEASE TRANSDERMAL at 09:04

## 2024-05-24 RX ADMIN — ONDANSETRON 4 MG: 2 INJECTION INTRAMUSCULAR; INTRAVENOUS at 11:27

## 2024-05-24 RX ADMIN — LIDOCAINE HYDROCHLORIDE 100 MG: 20 INJECTION, SOLUTION EPIDURAL; INFILTRATION; INTRACAUDAL; PERINEURAL at 10:24

## 2024-05-24 RX ADMIN — VANCOMYCIN HYDROCHLORIDE 1500 MG: 1 INJECTION, POWDER, LYOPHILIZED, FOR SOLUTION INTRAVENOUS at 10:16

## 2024-05-24 RX ADMIN — FENTANYL CITRATE 50 MCG: 50 INJECTION, SOLUTION INTRAMUSCULAR; INTRAVENOUS at 10:16

## 2024-05-24 RX ADMIN — HYDROMORPHONE HYDROCHLORIDE 0.5 MG: 1 INJECTION, SOLUTION INTRAMUSCULAR; INTRAVENOUS; SUBCUTANEOUS at 11:31

## 2024-05-24 RX ADMIN — FENTANYL CITRATE 50 MCG: 50 INJECTION, SOLUTION INTRAMUSCULAR; INTRAVENOUS at 10:30

## 2024-05-24 RX ADMIN — PROPOFOL 200 MG: 10 INJECTION, EMULSION INTRAVENOUS at 10:24

## 2024-05-24 RX ADMIN — ONDANSETRON 4 MG: 2 INJECTION, SOLUTION INTRAMUSCULAR; INTRAVENOUS at 10:55

## 2024-05-24 RX ADMIN — OXYCODONE HYDROCHLORIDE 5 MG: 5 TABLET ORAL at 12:06

## 2024-05-24 RX ADMIN — MIDAZOLAM HYDROCHLORIDE 2 MG: 1 INJECTION, SOLUTION INTRAMUSCULAR; INTRAVENOUS at 10:16

## 2024-05-24 SDOH — HEALTH STABILITY: MENTAL HEALTH: CURRENT SMOKER: 1

## 2024-05-24 ASSESSMENT — ENCOUNTER SYMPTOMS
DIAPHORESIS: 0
SHORTNESS OF BREATH: 0
CONSTIPATION: 0
WOUND: 0
FEVER: 0
DIARRHEA: 0
VOMITING: 0
NAUSEA: 0
ARTHRALGIAS: 1
CHILLS: 0
NEUROLOGICAL NEGATIVE: 1
FATIGUE: 0

## 2024-05-24 ASSESSMENT — PAIN SCALES - GENERAL
PAIN_LEVEL: 2
PAINLEVEL_OUTOF10: 3
PAINLEVEL_OUTOF10: 3
PAINLEVEL_OUTOF10: 4
PAINLEVEL_OUTOF10: 3
PAINLEVEL_OUTOF10: 8
PAINLEVEL_OUTOF10: 7
PAINLEVEL_OUTOF10: 3
PAINLEVEL_OUTOF10: 5 - MODERATE PAIN
PAINLEVEL_OUTOF10: 4
PAINLEVEL_OUTOF10: 4

## 2024-05-24 ASSESSMENT — PAIN - FUNCTIONAL ASSESSMENT
PAIN_FUNCTIONAL_ASSESSMENT: 0-10

## 2024-05-24 ASSESSMENT — COLUMBIA-SUICIDE SEVERITY RATING SCALE - C-SSRS
1. IN THE PAST MONTH, HAVE YOU WISHED YOU WERE DEAD OR WISHED YOU COULD GO TO SLEEP AND NOT WAKE UP?: NO
2. HAVE YOU ACTUALLY HAD ANY THOUGHTS OF KILLING YOURSELF?: NO
6. HAVE YOU EVER DONE ANYTHING, STARTED TO DO ANYTHING, OR PREPARED TO DO ANYTHING TO END YOUR LIFE?: NO

## 2024-05-24 NOTE — OP NOTE
Induced arthroscopy Knee (L) Operative Note     Date: 2024  OR Location: [facility] OR    Name: Shana Pierce, : 1985, Age: 38 y.o., MRN: [id number], Sex: female    Diagnosis  Pre-op Diagnosis     * Pain in left knee [M25.562] Post-op Diagnosis     * Pain in left knee [M25.562]     Procedures  Arthroscopy Knee  48194 - NC ARTHROSCOPY KNEE DIAGNOSTIC W/WO SYNOVIAL BX SPX      Surgeons      * Elias Weathers - Primary    Resident/Fellow/Other Assistant:  Surgeons and Role:     * Karen Mejia PA-C - Assisting    Procedure Summary  Anesthesia: Consult  ASA: II  Anesthesia Staff: CRNA: JANETTE Galindo-CRNA  Estimated Blood Loss: 10mL  Intra-op Medications:   Administrations occurring from 1000 to 1105 on 24:   Medication Name Total Dose   BUPivacaine-EPINEPHrine (Marcaine w/EPI) 0.5 %-1:200,000 injection 20 mL              Anesthesia Record               Intraprocedure I/O Totals          Intake    Propofol Drip 20.00 mL    The total shown is the total volume documented since Anesthesia Start was filed.    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 female who is having surgery for Pain in left knee [M25.562].  Patient presented to outpatient orthopedic surgical office with left knee pain following previous ACL reconstruction.  She had been doing relatively well  but she developed some lateral sided knee pain difficulty with activities and sensation of instability this is been bothering her quite a bit despite wearing the brace we did repeat an MRI which did not demonstrate any obvious pathology the patient was desirous for surgical arthroscopy for evaluation of the integrity of the graft there is benefits alternative for discussion patient was agreeable to proceed    The patient was seen in the preoperative area. The risks, benefits, complications, treatment options, non-operative alternatives, expected recovery and outcomes were discussed with the patient. The possibilities of reaction to medication, pulmonary aspiration, injury to surrounding structures, bleeding, recurrent infection, the need for additional procedures, failure to diagnose a condition, and creating a complication requiring transfusion or operation were discussed with the patient. The patient concurred with the proposed plan, giving informed consent.  The site of surgery was properly noted/marked if necessary per policy. The patient has been actively warmed in preoperative area. Preoperative antibiotics have been ordered and given within 1 hours of incision. Venous thrombosis prophylaxis have been ordered including unilateral sequential compression device    Procedure Details: Patient was taken the preoperative holding area once again the risk benefits alternatives were discussed the operative site is marked agreed upon patient was then taken the operative room by nursing anesthesia staff placed supine on the operative table all bony prominences well-padded and protected IV sedation was administered the patient was appropriate sedated general anesthesia was induced when patient was appropriate anesthetized a well-padded turns placed on the left thigh and left lower extremity was prepped and draped in typical fashion operative timeout was undertaken identifying correct patient site and procedure all in  agreement preoperative antibiotics were administered an Esmarch bandage used to exsanguinate the left lower extremity tourniquet was inflated to 250 mmHg a standard lateral stab incision was made with a 15 blade and the arthroscopic trocar was advanced in the knee joint diagnostic arthroscopy is undertaken there were no loose bodies in the suprapatellar pouch no evidence of significant chondromalacia of the patellar trochlea there were no loose bodies in the medial or lateral periarticular gutters ACL graft was intact there was a small area of impingement at the superior aspect of the intercondylar notch but no evidence of graft failure or laxity the medial lateral meniscus were intact and healthy throughout their course there was some moderate grade III chondromalacia of the medial femoral condyle as well as an area of grade 3 chondral loss lateral tibial plateau with some fraying of the cartilage medial portal was then established by localizing with a spinal needle and the arthroscopic shaver was used to provide a notchplasty and to remove some of the previous scar tissue as well as to perform chondroplasty of the medial femoral condyle and lateral tibial plateau at this point arthroscope was removed and 20 cc of quarter percent Marcaine were injected around the knee incisions were closed with 4-0 Prolene in interrupted fashion Xeroform 4 x 4 ABD and Ace wrap was placed on the leg patient was awake from anesthesia transferred to [facility] taken to PACU in stable condition tolerated the procedure well without complication all needle sponge counts were correct  Complications:  None; patient tolerated the procedure well.    Disposition: PACU - hemodynamically stable.  Condition: stable         Additional Details: Patient can be weightbearing as tolerated range of motion as tolerated ice elevate be given a course of pain medication and follow-up in 10 days for wound check    Attending Attestation:     Elias LO  Jasiel  Phone Number: [phone]

## 2024-05-24 NOTE — ANESTHESIA PROCEDURE NOTES
Airway  Date/Time: 5/24/2024 10:25 AM  Urgency: elective    Airway not difficult    Staffing  Performed: CRNA   Authorized by: VESTA Galindo    Performed by: VESTA Galindo  Patient location during procedure: OR    Indications and Patient Condition  Indications for airway management: anesthesia  Spontaneous Ventilation: absent  Sedation level: deep  Preoxygenated: yes  Patient position: sniffing  Mask difficulty assessment: 2 - vent by mask + OA or adjuvant +/- NMBA  No planned trial extubation    Final Airway Details  Final airway type: supraglottic airway      Successful airway: Size 5  Airway Seal Pressure (cm H2O): 30     Number of attempts at approach: 1  Number of other approaches attempted: 0    Additional Comments  Igel LMA inserted without difficulty

## 2024-05-24 NOTE — H&P
History Of Present Illness  Shana Pierce is a 38 y.o. female presenting with left knee pain. She is here for a left knee arthroscopy with possible ACL repair. In 2023 she had a left ACL repair after an injury at work that previous January. States she had the expected pain post-op but now she is in more pain that before the surgery. ROM intact. Last saw Dr. Weathers within two months. She received a medical clearance from her PCP, Dr. Lopez, on 24. She is a smoker, will start chantix after surgery. History of PONV. Denies fever, chills, SOB, CP, n/v/d.      Past Medical History  Past Medical History:   Diagnosis Date    Asthma ([facility])     Bee allergy status 2016    Bee sting allergy    Complex regional pain syndrome I of unspecified upper limb 2015    RSD upper limb    Delayed emergence from general anesthesia     Impaired fasting glucose 2015    Elevated fasting glucose    PONV (postoperative nausea and vomiting)        Surgical History  Past Surgical History:   Procedure Laterality Date     SECTION, CLASSIC  2015     Section    CHOLECYSTECTOMY  2015    Cholecystectomy Laparoscopic    KNEE SURGERY Left 2023    pt had acl repair        Social History  She reports that she has been smoking cigarettes. She started smoking about 24 years ago. She has a 6.1 pack-year smoking history. She has never used smokeless tobacco. She reports that she does not drink alcohol and does not use drugs.    Family History  No family history on file.     Allergies  Penicillins, Cortisone, Allergen ext-venom-honey bee, Shellfish containing products, and Latex    Review of Systems   Constitutional:  Negative for chills, diaphoresis, fatigue and fever.   HENT: Negative.     Respiratory:  Negative for shortness of breath.    Cardiovascular:  Negative for chest pain.   Gastrointestinal:  Negative for constipation, diarrhea, nausea and vomiting.   Genitourinary: Negative.     Musculoskeletal:  Positive for arthralgias.        Pain to lateral left knee, posterior musculature of left knee and pain below patella of left knee   Skin:  Negative for pallor, rash and wound.   Neurological: Negative.         Physical Exam  Constitutional:       General: She is not in acute distress.     Appearance: She is obese.   HENT:      Head: Normocephalic.      Mouth/Throat:      Mouth: Mucous membranes are moist.   Eyes:      General: No scleral icterus.     Conjunctiva/sclera: Conjunctivae normal.      Pupils: Pupils are equal, round, and reactive to light.   Cardiovascular:      Rate and Rhythm: Normal rate and regular rhythm.      Pulses: Normal pulses.      Heart sounds: Normal heart sounds.   Pulmonary:      Effort: Pulmonary effort is normal. No respiratory distress.      Breath sounds: Normal breath sounds.   Musculoskeletal:         General: Tenderness present. No swelling. Normal range of motion.      Right lower leg: No edema.      Left lower leg: No edema.      Comments: Pain to posterior palpation of left knee   Skin:     General: Skin is warm and dry.   Neurological:      General: No focal deficit present.      Mental Status: She is alert and oriented to person, place, and time. Mental status is at baseline.   Psychiatric:         Mood and Affect: Mood normal.          Last Recorded Vitals  Vitals:    05/24/24 0858   BP: 115/70   Pulse: 81   Resp: 17   Temp: 36.3 °C (97.3 °F)   SpO2: 96%           Assessment/Plan   Left knee pain    Left knee arthroscopy with possible ACL repair    She is crutch trained and brought crutches in today.       I spent 35 minutes in the professional and overall care of this patient.      Karen Mejia PA-C

## 2024-05-24 NOTE — ANESTHESIA POSTPROCEDURE EVALUATION
Patient: Shana Pierce    Procedure Summary       Date: 05/24/24 Room / Location: [facility] OR 01 / [facility]    Anesthesia Start: 1016 Anesthesia Stop:     Procedure: Arthroscopy Knee (Left: Knee) Diagnosis:       Pain in left knee      (Pain in left knee [M25.562])    Surgeons: Elias Weathers DO Responsible Provider: VESTA Galindo    Anesthesia Type: general ASA Status: 2            Anesthesia Type: general    Vitals Value Taken Time   /94 05/24/24 1115   Temp 36.7 °C (98.1 °F) 05/24/24 1115   Pulse 98 05/24/24 1120   Resp 19 05/24/24 1120   SpO2 100 % 05/24/24 1115       Anesthesia Post Evaluation    Patient location during evaluation: PACU  Patient participation: complete - patient participated  Level of consciousness: awake and alert  Pain score: 2  Pain management: adequate  Airway patency: patent  Cardiovascular status: acceptable  Respiratory status: acceptable  Hydration status: acceptable  Postoperative Nausea and Vomiting: none        There were no known notable events for this encounter.

## 2024-05-24 NOTE — LETTER
May 24, 2024     Patient: Shana Pierce   YOB: 1985   Date of Visit: 5/24/24       To Whom It May Concern:    It is my medical opinion that Shana Pierce should remain out of work until 6/3/24.    It is my medical opinion that Shana Pierce may return to light duty immediately with the following restrictions: no standing for more than an hour at a time before 6/6/24 .    If you have any questions or concerns, please don't hesitate to call. [phone].       Sincerely,            Karen Mejia PA-C    CC: No Recipients

## 2024-05-28 ASSESSMENT — PAIN SCALES - GENERAL: PAINLEVEL_OUTOF10: 1

## 2024-06-26 ENCOUNTER — HOSPITAL ENCOUNTER (OUTPATIENT)
Dept: RADIOLOGY | Facility: HOSPITAL | Age: 39
Discharge: HOME | End: 2024-06-26
Payer: COMMERCIAL

## 2024-06-26 DIAGNOSIS — M79.662 PAIN IN LEFT LOWER LEG: ICD-10-CM

## 2024-06-26 PROCEDURE — 93971 EXTREMITY STUDY: CPT

## 2024-06-26 PROCEDURE — 93971 EXTREMITY STUDY: CPT | Performed by: RADIOLOGY

## 2024-08-16 ENCOUNTER — HOSPITAL ENCOUNTER (OUTPATIENT)
Dept: RADIOLOGY | Facility: EXTERNAL LOCATION | Age: 39
Discharge: HOME | End: 2024-08-16

## 2024-08-16 DIAGNOSIS — M79.641 RIGHT HAND PAIN: ICD-10-CM

## 2024-08-30 ENCOUNTER — HOSPITAL ENCOUNTER (OUTPATIENT)
Dept: RADIOLOGY | Facility: CLINIC | Age: 39
Discharge: HOME | End: 2024-08-30
Payer: COMMERCIAL

## 2024-08-30 ENCOUNTER — OFFICE VISIT (OUTPATIENT)
Dept: ORTHOPEDIC SURGERY | Facility: CLINIC | Age: 39
End: 2024-08-30
Payer: COMMERCIAL

## 2024-08-30 DIAGNOSIS — M25.562 LEFT KNEE PAIN, UNSPECIFIED CHRONICITY: ICD-10-CM

## 2024-08-30 DIAGNOSIS — Z98.890 S/P ACL RECONSTRUCTION: Primary | ICD-10-CM

## 2024-08-30 PROCEDURE — 73562 X-RAY EXAM OF KNEE 3: CPT | Mod: LT

## 2024-08-30 PROCEDURE — 99204 OFFICE O/P NEW MOD 45 MIN: CPT | Performed by: STUDENT IN AN ORGANIZED HEALTH CARE EDUCATION/TRAINING PROGRAM

## 2024-08-30 PROCEDURE — 99214 OFFICE O/P EST MOD 30 MIN: CPT | Performed by: STUDENT IN AN ORGANIZED HEALTH CARE EDUCATION/TRAINING PROGRAM

## 2024-08-30 ASSESSMENT — PAIN DESCRIPTION - DESCRIPTORS: DESCRIPTORS: ACHING;THROBBING

## 2024-08-30 ASSESSMENT — PAIN SCALES - GENERAL: PAINLEVEL_OUTOF10: 5 - MODERATE PAIN

## 2024-08-30 ASSESSMENT — PAIN - FUNCTIONAL ASSESSMENT: PAIN_FUNCTIONAL_ASSESSMENT: 0-10

## 2024-08-30 NOTE — PROGRESS NOTES
Nelida Terry MD   Adult Reconstruction and Joint Replacement Surgery  Phone: [phone]     Fax: [phone]       Name: Shana Pierce  Age: 39 y.o.   : 1985   Date of Visit: 2024    INITIAL CONSULTATION    CC: Left knee pain    Clinical History:  This patient presents with 2 years of LEFT knee pain. They were referred by Dr. Weathers.     Patient has tried the following Ice, NSAIDs, Tylenol (arthritis dosing) , Activity modification, Physical therapy, Brace , Corticosteroid injections , Xray, and Arthroscopic Surgery. Patient does have pain at night. Patient does not report falls related to this problem. Patient is able to walk indoors only. Patient is currently using nothing as assistive device. Primarily complains of diffuse pain. Patient has difficulty with climbing stairs, descending stairs, walking , getting up from a chair, prolonged sitting , and walking on unlevel surfaces . The pain is significantly impacting their ability to perform activities of daily living. Patient reports no longer able to do activities such as paintball and running/jogging.     The patient reports no fever, chills or night sweats. No wound drainage. No interval trauma. No recent hospitalizations or infections. No recent dental work.     Date of initial surgery: 2023 ACL Repair/Resurfacing (Dr. Weathers); 2024 ACL Revision (Dr. Weathers, [facility])  Date of most recent surgery: 2024    Focused History  PMH: Reviewed and PE/DVT: no  PSH: Reviewed , Hip/Knee replacement: no, Hip/Knee surgery: 2023 ACL Repair/Resurfacing (Dr. Weathers); 2024 ACL Revision (Dr. Weathers) Anesthesia complications: no, Spine surgery: no, Surgical infection: no, and Weight loss surgery: no  Meds: Reviewed, Current Anticoagulants: no, Weight loss medication: no, and Current Opioids: no  Allergies: Reviewed  and The patient reports no contraindications or allergies to cephalosporins, aspirin, NSAIDs or opioids, except as  noted above.  FH: No family history of any bleeding or clotting disorders.  SHx: Reviewed, Occupation: , Current smoker: yes, EtOH intake weekly: no, Social support: Elias Boyce, and Preferred physical activities: Paintball, hiking, walking dogs  Dental Hx: Last routine cleaning: not disclosed and Discussed that all invasive dental work must be completed at least 3 months prior to joint replacement surgery. Patient understands they are to avoid any invasive dental work 3-6 months post-surgically.   Jehovah´s Witness: no    PROMs/HISTORY  No questionnaires on file.    Review of Systems: Review of systems completed with medical assistant intake. Please refer to this note.     Physical Exam:  BMI: 39.    General: The patient is well appearing and has an appropriate affect.     Neurological Examination: SILT in SPN/DPN/Sural/Saphenous/Tibial nerves. 5/5 EHL, FHL, Tibial anterior, Gastrocnemius. Coordination grossly intact.     Cardiovascular Exam: Capillary refill <2 seconds. No edema.      Lymphatic Examination: There is no obvious lymphatic swelling present around the involved joint.    Skin Exam: Skin around the pertinent joint is without evidence of infection or rash.    Gait: The patient ambulates with an antalgic gait.     Lumbar spine:    Negative straight leg raise bilaterally.    Right Hip Examination:  The skin is intact over the hip.     There is no tenderness over the greater trochanter.    Range of motion is full extension to 100 degrees of flexion.    The hip is stable without subluxation or dislocation.    The hip internally rotates to 15 degrees and externally rotates to 45 degrees.    There is no pain with hip motion.    Left Hip Examination:  The skin is intact over the hip.    There is no tenderness over the greater trochanter.    Range of motion is full extension to 100 degrees of flexion.    The hip is stable without subluxation or dislocation.    The hip internally rotates to 15  "degrees and externally rotates to 45 degrees.    There is no pain with hip motion.    Left Knee Examination:  Examination of the left knee reveals the skin to be intact. Prior incision: Multiple longitudinal incisions, well healed.    There is a small effusion in the knee.    The alignment of the knee is neutral.    This deformity is correctable.    There is tenderness to palpation over the joint line.    There is significant quadriceps atrophy.    Range of Motion: full extension to 120 degrees of flexion.    The knee is stable to varus-valgus stress and anterior-posterior stress.     There is no grinding with range of motion.    There is mild patellofemoral crepitus.    Right Knee Examination:  Examination of the right knee reveals the skin to be intact.     There is no obvious swelling.    There is a no effusion in the knee.     The alignment of the knee is normal.    There is no tenderness to palpation over the joint line.    There is no significant quadriceps atrophy.    Range of motion is full extension to 120 degrees of flexion.    The knee is stable to varus-valgus stress and anterior-posterior stress.     There is no grinding with range of motion.    There is no patellofemoral crepitus.    Prior Labs:   Prior Labs:   Lab Results   Component Value Date    WBC 8.1 05/09/2024    HGB 15.0 05/09/2024    HCT 45.8 05/09/2024    MCV 97 05/09/2024     05/09/2024      No results found for: \"INR\", \"PROTIME\"      Lab Results   Component Value Date    GLUCOSE 108 (H) 05/09/2024    CALCIUM 8.9 05/09/2024     05/09/2024    K 4.3 05/09/2024    CO2 21 (L) 05/09/2024     (H) 05/09/2024    BUN 14 05/09/2024    CREATININE 0.70 05/09/2024      No results found for: \"CKTOTAL\", \"CKMB\", \"CKMBINDEX\", \"TROPONINI\"   Lab Results   Component Value Date    HGBA1C 5.2 05/23/2023         No results found for: \"CRP\"   No results found for: \"SEDRATE\"      Radiographs:  Radiographs were personally reviewed today with the " patient.  There are postsurgical changes of prior ACL reconstruction.  There is minimal arthritis of the knee.    Impression:  This patient presents status post revision left knee ACL reconstruction with minimal arthritis on plain radiographs.    Diagnosis:  S/P ACL reconstruction    Left knee pain, unspecified chronicity     Recommendations / Plan:    Clinical findings and imaging results were reviewed with the patient today.  The patient is inquiring about proceeding with left total knee replacement surgery.    I have discussed the options in detail with the patient. We have discussed anti-inflammatory medication, activity modification, physical therapy, and knee replacement surgery.  The patient is not a candidate for knee replacement surgery at this time.    Reviewed steps for optimization of prosthetic joint function. Currently their BMI is 39.  Discussed that obesity is a risk factor for continued progression of osteoarthritis. Each pound of weight loss offloads their hip and knee joints by 3-6 pounds.  The most effective of these options is weight loss mainly through restricting caloric intake.     The patient has ongoing physical therapy and I encouraged her to continue .  Patient will continue their home exercise program. Strategies for pain management using over-the-counter anti-inflammatory medications reviewed.  The patient recently had a steroid injection with her previous provider at [facility].  She is already able to the brace from her prior surgery.  Encouraged them to maintain range of motion and strength around the knee joints.  They will continue to implement these strategies in addressing their pain.      Recommend the patient continue optimizing nonsurgical treatment interventions as outlined above for management of their joint.  She may follow-up with her index surgeon in group.  The patient verbalizes understanding with the recommendations and treatment plan as outlined above and  is in agreement.  Questions were addressed.      _____________  Nelida Terry MD   Attending Orthopaedic Surgeon  [facility]    [facility]       This note was transcribed with dictation software.  Please excuse any typographical errors, program misunderstandings leading to inadvertent insertions or deletions of inappropriate wording, pronoun errors and other unintentional transcription errors not noticed on proof-reading.

## 2025-03-05 ENCOUNTER — HOSPITAL ENCOUNTER (EMERGENCY)
Facility: HOSPITAL | Age: 40
Discharge: HOME | End: 2025-03-05
Payer: COMMERCIAL

## 2025-03-05 ENCOUNTER — APPOINTMENT (OUTPATIENT)
Dept: RADIOLOGY | Facility: HOSPITAL | Age: 40
End: 2025-03-05
Payer: COMMERCIAL

## 2025-03-05 VITALS
BODY MASS INDEX: 39.99 KG/M2 | TEMPERATURE: 98.3 F | SYSTOLIC BLOOD PRESSURE: 140 MMHG | HEART RATE: 87 BPM | WEIGHT: 240 LBS | RESPIRATION RATE: 18 BRPM | DIASTOLIC BLOOD PRESSURE: 76 MMHG | OXYGEN SATURATION: 98 % | HEIGHT: 65 IN

## 2025-03-05 DIAGNOSIS — M25.562 ACUTE PAIN OF LEFT KNEE: Primary | ICD-10-CM

## 2025-03-05 PROCEDURE — 99284 EMERGENCY DEPT VISIT MOD MDM: CPT | Mod: 25

## 2025-03-05 PROCEDURE — 73700 CT LOWER EXTREMITY W/O DYE: CPT | Mod: LT

## 2025-03-05 PROCEDURE — 73700 CT LOWER EXTREMITY W/O DYE: CPT | Mod: LEFT SIDE | Performed by: RADIOLOGY

## 2025-03-05 PROCEDURE — 2500000001 HC RX 250 WO HCPCS SELF ADMINISTERED DRUGS (ALT 637 FOR MEDICARE OP)

## 2025-03-05 RX ORDER — TRAMADOL HYDROCHLORIDE 50 MG/1
50 TABLET ORAL EVERY 8 HOURS PRN
Qty: 12 TABLET | Refills: 0 | Status: SHIPPED | OUTPATIENT
Start: 2025-03-05 | End: 2025-03-10

## 2025-03-05 RX ORDER — NAPROXEN 250 MG/1
500 TABLET ORAL ONCE
Status: COMPLETED | OUTPATIENT
Start: 2025-03-05 | End: 2025-03-05

## 2025-03-05 RX ORDER — NAPROXEN 500 MG/1
500 TABLET ORAL
Qty: 30 TABLET | Refills: 0 | Status: SHIPPED | OUTPATIENT
Start: 2025-03-05 | End: 2025-03-20

## 2025-03-05 RX ORDER — TRAMADOL HYDROCHLORIDE 50 MG/1
50 TABLET ORAL ONCE
Status: COMPLETED | OUTPATIENT
Start: 2025-03-05 | End: 2025-03-05

## 2025-03-05 RX ADMIN — NAPROXEN 500 MG: 250 TABLET ORAL at 17:26

## 2025-03-05 RX ADMIN — TRAMADOL HYDROCHLORIDE 50 MG: 50 TABLET, COATED ORAL at 17:26

## 2025-03-05 ASSESSMENT — PAIN - FUNCTIONAL ASSESSMENT
PAIN_FUNCTIONAL_ASSESSMENT: 0-10
PAIN_FUNCTIONAL_ASSESSMENT: 0-10

## 2025-03-05 ASSESSMENT — COLUMBIA-SUICIDE SEVERITY RATING SCALE - C-SSRS
2. HAVE YOU ACTUALLY HAD ANY THOUGHTS OF KILLING YOURSELF?: NO
6. HAVE YOU EVER DONE ANYTHING, STARTED TO DO ANYTHING, OR PREPARED TO DO ANYTHING TO END YOUR LIFE?: NO
1. IN THE PAST MONTH, HAVE YOU WISHED YOU WERE DEAD OR WISHED YOU COULD GO TO SLEEP AND NOT WAKE UP?: NO

## 2025-03-05 ASSESSMENT — PAIN SCALES - GENERAL: PAINLEVEL_OUTOF10: 2

## 2025-03-05 NOTE — ED PROVIDER NOTES
"HPI   Chief Complaint   Patient presents with    Knee Pain     Cherry some pops in left knee when standing a few days ago, is having numbness and tingling still. Chronic issues with knee       Patient is a 39-year-old female presenting with left knee pain.  She states that she has had 2 ACL surgeries, one being a revision.  She states that 2 days ago when she stood up she heard for loud pops in her left knee and then had a fall landing on her left knee.  Since then, she states she has had a hard time ambulating due to pain to the anterior aspect of left knee.  She states that there are \"burning and pinprick feelings down my left leg.  \"She did have \"dry needling \"done to her left knee at some point in  and she states that that did help with her discomfort.  She has recently tried ibuprofen with no relief.  She tried to reach out to her orthopedic office who did not respond so she presents to the emergency department.  She is looking for a second opinion on orthopedics to follow with for her left knee issues.  She has had differing opinions on whether or not to have a knee replacement.  She has tried cortisone shots but had an allergic reaction so she is withdrawn from those.  Patient denies fevers, chills, cough, sore throat, runny nose, chest pain, shortness of breath, abdominal pain, nausea, vomiting, diarrhea or urinary complaints.              Patient History   Past Medical History:   Diagnosis Date    Asthma ([facility])     Bee allergy status 2016    Bee sting allergy    Complex regional pain syndrome I of unspecified upper limb 2015    RSD upper limb    Delayed emergence from general anesthesia     Impaired fasting glucose 2015    Elevated fasting glucose    PONV (postoperative nausea and vomiting)      Past Surgical History:   Procedure Laterality Date     SECTION, CLASSIC  2015     Section    CHOLECYSTECTOMY  2015    Cholecystectomy Laparoscopic    KNEE SURGERY " Left 09/2023    pt had acl repair     No family history on file.  Social History     Tobacco Use    Smoking status: Every Day     Current packs/day: 0.25     Average packs/day: 0.3 packs/day for 25.2 years (6.3 ttl pk-yrs)     Types: Cigarettes     Start date: 2000    Smokeless tobacco: Never    Tobacco comments:     Has not smoked in a day   Vaping Use    Vaping status: Never Used   Substance Use Topics    Alcohol use: Never    Drug use: Never       Physical Exam   ED Triage Vitals [03/05/25 1659]   Temperature Heart Rate Respirations BP   36.8 °C (98.3 °F) (!) 103 18 140/76      Pulse Ox Temp Source Heart Rate Source Patient Position   98 % Temporal Monitor Sitting      BP Location FiO2 (%)     Right arm --       Physical Exam  Vitals and nursing note reviewed.   Constitutional:       Appearance: She is well-developed.      Comments: Awake, sitting in examination chair   HENT:      Head: Normocephalic and atraumatic.      Nose: Nose normal.      Mouth/Throat:      Mouth: Mucous membranes are moist.      Pharynx: Oropharynx is clear.   Eyes:      Extraocular Movements: Extraocular movements intact.      Conjunctiva/sclera: Conjunctivae normal.      Pupils: Pupils are equal, round, and reactive to light.   Cardiovascular:      Rate and Rhythm: Normal rate and regular rhythm.      Pulses: Normal pulses.      Heart sounds: Normal heart sounds. No murmur heard.  Pulmonary:      Effort: Pulmonary effort is normal. No respiratory distress.      Breath sounds: Normal breath sounds.   Abdominal:      General: Abdomen is flat.      Palpations: Abdomen is soft.      Tenderness: There is no abdominal tenderness.   Musculoskeletal:         General: No swelling. Normal range of motion.      Cervical back: Normal range of motion and neck supple.      Comments: Tenderness palpation of the anterior, lateral, medial aspects of left knee   Skin:     General: Skin is warm and dry.      Capillary Refill: Capillary refill takes less  than 2 seconds.      Comments: Strong DP and PT pulse in the bilateral lower extremity   Neurological:      General: No focal deficit present.      Mental Status: She is alert and oriented to person, place, and time.      Comments: Awake, alert and oriented x 3. Power intact in the upper and lower extremities. Sensation is intact to light touch in the upper and lower extremities. Cranial Nerves 2-12 are intact. Patella DTRs intact. Finger-to-nose intact. No truncal ataxia.   Psychiatric:         Mood and Affect: Mood normal.         Behavior: Behavior normal.           ED Course & MDM   Diagnoses as of 03/05/25 1944   Acute pain of left knee                 No data recorded     Searcy Coma Scale Score: 15 (03/05/25 1704 : Zoey Garcia RN)                           Medical Decision Making  Patient is a 39-year-old female presenting with left knee pain.  Imaging ordered.  Naproxen and tramadol ordered.  Conditions considered include but are not limited to: Contusion, fracture, ligamentous injury.    On physical exam, patient is having subjective sensory changes as far as tingling but no true numbness.  She has full sensation to bilateral lower extremities.  CT without acute fracture or dislocation.  It does show underlying osteoarthritic change and spurring.  Patient neurovascularly intact.  I did apply an Ace wrap to patient's left knee.  She is stating that she feels improved after medication.  Neurovascular exam after Ace wrap within normal limits.    I believe this patient is at low risk for complication, and a disposition of discharge is acceptable.  Return to the Emergency Department if new or worsening symptoms including headache, fever, chills, chest pain, shortness of breath, syncope, near syncope, abdominal pain, nausea, vomiting,  diarrhea, or worsening pain.  Prescription for naproxen and tramadol written.  Referral to pain management given.  Referral to orthopedics given.  Patient is agreeable to a  disposition of discharge and utilize rest/ice/compression/elevation as well as prescription and over-the-counter medications to help with discomfort.  She was agreeable to follow with orthopedics in the next several days.    Portions of this note made with Dragon software, please be mindful of potential grammatical errors.      Medications   naproxen (Naprosyn) tablet 500 mg (500 mg oral Given 3/5/25 1726)   traMADol (Ultram) tablet 50 mg (50 mg oral Given 3/5/25 1726)     CT knee left wo IV contrast   Final Result   No acute fracture or dislocation.             MACRO:   None        Signed by: Pk Al 3/5/2025 6:08 PM   Dictation workstation:   [id number]            Procedure  Procedures     Yared Sanchez PA-C  03/05/25 1944

## 2025-03-05 NOTE — DISCHARGE INSTRUCTIONS
Utilize over-the-counter and prescription medication as needed.  Recommend rest, ice, compression, elevation for your discomfort.  Referral to pain management.  I would not take naproxen and ibuprofen together.  Take 1 or the other.  You can take Tylenol with either ibuprofen or naproxen.    Be sure to take all medications, over the counter medications or prescription medications only as directed.    Be sure to follow up as directed in 1-2 days. All of the details of your follow up instructions are detailed in the follow up section of this packet.    If you are being discharged with any pains medications or muscle relaxers (norco, Vicodin, hydrocodone products, Percocet, oxycodone products, flexeril, cyclobenzaprine, robaxin, norflex, brand or generic, or any other pain controlling medications with the exception of Ibuprofen and regular Tylenol, do not drive or operate machinery, climb ladders or participate in any activity that could potentially put yourself or others at risk should you get dizzy, or be/feel impaired at all.    Return to emergency room without delay for ANY new or worsening pains or for any other symptoms or concerns. Return with worsening pains, nausea, vomiting, trouble breathing, palpitations, shortness of breath, inability to pass stool or urine, loss of control of stool or urine, any numbness or tingling (that is not normal for you), uncontrolled fevers, the passing of blood or other material in stool or urine, rashes, pains or for any other symptoms or concerns you may have. You are always welcome to return to the ER at any time for any reason or for any other concerns you may have.

## 2025-03-05 NOTE — Clinical Note
Shana Pierce was seen and treated in our emergency department on 3/5/2025.  She may return to work on 03/07/2025.       If you have any questions or concerns, please don't hesitate to call.      Yared Sanchez PA-C

## 2025-03-19 ENCOUNTER — OFFICE VISIT (OUTPATIENT)
Dept: PAIN MEDICINE | Facility: CLINIC | Age: 40
End: 2025-03-19
Payer: COMMERCIAL

## 2025-03-19 VITALS
HEIGHT: 65 IN | SYSTOLIC BLOOD PRESSURE: 122 MMHG | OXYGEN SATURATION: 98 % | HEART RATE: 68 BPM | BODY MASS INDEX: 39.99 KG/M2 | RESPIRATION RATE: 18 BRPM | DIASTOLIC BLOOD PRESSURE: 66 MMHG | WEIGHT: 240 LBS

## 2025-03-19 DIAGNOSIS — G89.29 CHRONIC PAIN OF LEFT KNEE: Primary | ICD-10-CM

## 2025-03-19 DIAGNOSIS — M17.12 PRIMARY OSTEOARTHRITIS OF LEFT KNEE: ICD-10-CM

## 2025-03-19 DIAGNOSIS — M25.562 CHRONIC PAIN OF LEFT KNEE: Primary | ICD-10-CM

## 2025-03-19 PROCEDURE — G2211 COMPLEX E/M VISIT ADD ON: HCPCS | Performed by: STUDENT IN AN ORGANIZED HEALTH CARE EDUCATION/TRAINING PROGRAM

## 2025-03-19 PROCEDURE — 3008F BODY MASS INDEX DOCD: CPT | Performed by: STUDENT IN AN ORGANIZED HEALTH CARE EDUCATION/TRAINING PROGRAM

## 2025-03-19 PROCEDURE — 99204 OFFICE O/P NEW MOD 45 MIN: CPT | Performed by: STUDENT IN AN ORGANIZED HEALTH CARE EDUCATION/TRAINING PROGRAM

## 2025-03-19 PROCEDURE — 99214 OFFICE O/P EST MOD 30 MIN: CPT | Performed by: STUDENT IN AN ORGANIZED HEALTH CARE EDUCATION/TRAINING PROGRAM

## 2025-03-19 RX ORDER — DULOXETIN HYDROCHLORIDE 30 MG/1
CAPSULE, DELAYED RELEASE ORAL
Qty: 60 CAPSULE | Refills: 1 | Status: SHIPPED | OUTPATIENT
Start: 2025-03-19

## 2025-03-19 RX ORDER — NABUMETONE 750 MG/1
750 TABLET, FILM COATED ORAL 2 TIMES DAILY
Qty: 60 TABLET | Refills: 1 | Status: SHIPPED | OUTPATIENT
Start: 2025-03-19 | End: 2025-05-18

## 2025-03-19 ASSESSMENT — ENCOUNTER SYMPTOMS
OCCASIONAL FEELINGS OF UNSTEADINESS: 1
DEPRESSION: 1
LOSS OF SENSATION IN FEET: 1

## 2025-03-19 ASSESSMENT — PATIENT HEALTH QUESTIONNAIRE - PHQ9
1. LITTLE INTEREST OR PLEASURE IN DOING THINGS: NEARLY EVERY DAY
SUM OF ALL RESPONSES TO PHQ QUESTIONS 1-9: 18
2. FEELING DOWN, DEPRESSED OR HOPELESS: NEARLY EVERY DAY
4. FEELING TIRED OR HAVING LITTLE ENERGY: NEARLY EVERY DAY
10. IF YOU CHECKED OFF ANY PROBLEMS, HOW DIFFICULT HAVE THESE PROBLEMS MADE IT FOR YOU TO DO YOUR WORK, TAKE CARE OF THINGS AT HOME, OR GET ALONG WITH OTHER PEOPLE: EXTREMELY DIFFICULT
5. POOR APPETITE OR OVEREATING: MORE THAN HALF THE DAYS
9. THOUGHTS THAT YOU WOULD BE BETTER OFF DEAD, OR OF HURTING YOURSELF: NOT AT ALL
6. FEELING BAD ABOUT YOURSELF - OR THAT YOU ARE A FAILURE OR HAVE LET YOURSELF OR YOUR FAMILY DOWN: NEARLY EVERY DAY
SUM OF ALL RESPONSES TO PHQ9 QUESTIONS 1 AND 2: 6
8. MOVING OR SPEAKING SO SLOWLY THAT OTHER PEOPLE COULD HAVE NOTICED. OR THE OPPOSITE, BEING SO FIGETY OR RESTLESS THAT YOU HAVE BEEN MOVING AROUND A LOT MORE THAN USUAL: SEVERAL DAYS
7. TROUBLE CONCENTRATING ON THINGS, SUCH AS READING THE NEWSPAPER OR WATCHING TELEVISION: NOT AT ALL
3. TROUBLE FALLING OR STAYING ASLEEP OR SLEEPING TOO MUCH: NEARLY EVERY DAY

## 2025-03-19 ASSESSMENT — PAIN - FUNCTIONAL ASSESSMENT: PAIN_FUNCTIONAL_ASSESSMENT: 0-10

## 2025-03-19 ASSESSMENT — PAIN SCALES - GENERAL
PAINLEVEL_OUTOF10: 10-WORST PAIN EVER
PAINLEVEL_OUTOF10: 10 - WORST POSSIBLE PAIN

## 2025-03-19 ASSESSMENT — LIFESTYLE VARIABLES: TOTAL SCORE: 2

## 2025-03-19 ASSESSMENT — PAIN DESCRIPTION - DESCRIPTORS: DESCRIPTORS: ACHING;THROBBING;SHARP;SHOOTING

## 2025-03-19 NOTE — PROGRESS NOTES
[facility]  New Patient Office Visit Note 3/19/2025    Patient Information: Shana Pierce, MRN: [id number], : 1985   Primary Care/Referring Physician: Russ Lopez DO, [address] [address] / [facility] [id number]     Chief Complaint: Left knee pain  History of Pain: Ms. Shana Pierce is a 39 y.o. female with a PMHx of asthma, migraines who presents for left knee pain.    She reports ongoing issues with knee pain for the past few years. She has undergone 2 ACL surgeries but continues to have pain. She is planning to see another orthopedic surgeon in 1.5 months for a 2nd opinion.    She describes pain in her left anteromedial knee, behind her knee cap, and in her left lateral knee. She describes this typically as a stabbing pain and sometimes as a throbbing, aching pain. She has difficulty identifying specific triggers for her pain, but feels that she has to be very careful about how she walks. She has occasional burning and tingling over the anterior knee. She has tried steroid injections for her pain in the past but had an allergic reaction    Current Pain Medications: Ibuprofen  Previously Tried Pain Medications: Tramadol, ?Meloxicam, Gabapentin - caused her to feel aggravated, Celecoxib - had side effects    Relevant Surgeries: Hx of ACL repair x2  Injections: Reports an allergic reaction to steroids  Physical/Occupational Therapy: She has completed PT and tried dry needling    Medications:   Current Outpatient Medications   Medication Instructions    alclometasone (Aclovate) 0.05 % cream Topical, 2 times daily    DULoxetine (Cymbalta) 30 mg DR capsule Take 1 capsule by mouth in the morning for 2 weeks. If tolerating increase to 2 capsules by mouth in the morning. Do not crush or chew.    ketoconazole (NIZOral) 2 % cream Topical, 2 times daily    levonorgestrel (Mirena) 21 mcg/24 hours (8 yrs) 52 mg IUD     nabumetone (RELAFEN) 750 mg, oral, 2 times daily    naproxen  (NAPROSYN) 500 mg, oral, 2 times daily (morning and late afternoon)    SUMAtriptan (IMITREX) 100 mg, oral, Once as needed    varenicline (Chantix Starting Month Box) 0.5 mg (11)- 1 mg (42) tablet Take 0.5 mg by mouth 2 times a day AND 1 mg 2 times a day.      Allergies:   Allergies   Allergen Reactions    Penicillins Rash and Hives    Cortisone Swelling    Allergen Ext-Venom-Honey Bee Hives    Shellfish Containing Products Other    Latex Rash       Past Medical & Surgical History:  Past Medical History:   Diagnosis Date    Asthma     Bee allergy status 2016    Bee sting allergy    Complex regional pain syndrome I of unspecified upper limb 2015    RSD upper limb    Delayed emergence from general anesthesia     Impaired fasting glucose 2015    Elevated fasting glucose    PONV (postoperative nausea and vomiting)       Past Surgical History:   Procedure Laterality Date     SECTION, CLASSIC  2015     Section    CHOLECYSTECTOMY  2015    Cholecystectomy Laparoscopic    KNEE SURGERY Left 2023    pt had acl repair       No family history on file.  Social History     Socioeconomic History    Marital status: Significant Other     Spouse name: Not on file    Number of children: Not on file    Years of education: Not on file    Highest education level: Not on file   Occupational History    Not on file   Tobacco Use    Smoking status: Every Day     Current packs/day: 0.25     Average packs/day: 0.3 packs/day for 25.2 years (6.3 ttl pk-yrs)     Types: Cigarettes     Start date:     Smokeless tobacco: Never    Tobacco comments:     Has not smoked in a day   Vaping Use    Vaping status: Never Used   Substance and Sexual Activity    Alcohol use: Yes     Comment: rare    Drug use: Never    Sexual activity: Not on file   Other Topics Concern    Not on file   Social History Narrative    Not on file     Social Drivers of Health     Financial Resource Strain: Not on file   Food  "Insecurity: Not on file   Transportation Needs: Not on file   Physical Activity: Not on file   Stress: Not on file   Social Connections: Not on file   Intimate Partner Violence: Not on file   Housing Stability: Not on file       Problems, Past medical history, past surgical history, Medications, allergies, social and family history reviewed and as per the electronic medical record from today's encounter    Review of Systems:  CONST: No fever, chills, fatigue, weight changes  EYES: No loss of vision  ENT: No hearing loss, tinnitus  CV: No chest pain, palpitations  RESP: No dyspnea, shortness of breath, cough  GI: No stool incontinence, nausea, vomiting  : No urinary incontinence  MSK: No joint swelling  SKIN: No rash, no hives  NEURO: No headache, dizziness  PSYCH: No anxiety, depression  HEM/LYMPH: No easy bruising or bleeding  All other systems reviewed are negative     Physical Exam:  Vitals: /66   Pulse 68   Resp 18   Ht 1.651 m (5' 5\")   Wt 109 kg (240 lb)   SpO2 98%   BMI 39.94 kg/m²   General: No apparent distress. Alert, appropriate, oriented x 3. Mood and affect normal. Speaking in full sentences.  HENT: Normocephalic, atraumatic. Hearing intact.  Eyes: Extraocular movements grossly intact. Pupils equal and round.   Neck: Supple, trachea midline.  Lungs: Symmetric respiratory excursion on visual exam, nonlabored breathing.  Extremities: No clubbing, cyanosis, or edema noted in legs.  Skin: No rashes, lesions, alopecia noted on back or extremities. Some mottling appreciated in bilateral legs  MSK: Reports pain to palpation of left anteromedial knee joint line. No crepitus appreciated.  Neuro: Alert and appropriate. Motor strength 5/5 throughout bilateral lower extremities. Sensory intact to light touch bilateral lower extremities. Gait within normal limits. Bulk and tone within normal limits.    Laboratory Data:  The following laboratory data were reviewed during this visit:   Lab Results " "  Component Value Date    WBC 8.1 05/09/2024    RBC 4.74 05/09/2024    HGB 15.0 05/09/2024    HCT 45.8 05/09/2024     05/09/2024      No results found for: \"INR\"  Lab Results   Component Value Date    CREATININE 0.70 05/09/2024    HGBA1C 5.2 05/23/2023       Imaging:  The following imaging impressions were reviewed by me during this visit:    -3/5/25 CT of left knee shows mild OA but is otherwise unremarkable       I also personally reviewed the images from the above studies myself. These images and my interpretation of them contributed to the management and decision making of the patient's medical plan.    ASSESSMENT:  Ms. Shana Pierce is a 39 y.o. female with left knee pain that is consistent with:    1. Chronic pain of left knee    2. Primary osteoarthritis of left knee        PLAN:    Diagnostics:   - No further diagnostics are indicated at this time.    Physical Therapy and Rehabilitation:     - She has completed PT and should maintain her HEP    Psychologically:  - No needs at this time    Medications  - Recommend trial of Nabumetone 750 mg BID PRN. Education on this medication provided today. Side effects reviewed  - Recommend trial of Duloxetine 60 mg daily for chronic MSK pain. Education on this medication provided today. Side effects reviewed. Titration instructions provided    Duration  - Multiple years    Interventions:  - The exact etiology of her persistent left knee pain is unclear. Given her failure to improve with conservative management, and her negative reaction to steroids, I discussed a [employer] PNS targeting her left saphenous nerve. She appears hesitant to do this, citing she is concerned she will break the device while working   - Would also consider genicular nerve blocks/RF ablation but she is hesitant to have this done        Sincerely,  Ferny Barrera MD  [facility]  "

## 2025-04-02 DIAGNOSIS — G89.29 CHRONIC PAIN OF LEFT KNEE: ICD-10-CM

## 2025-04-02 DIAGNOSIS — M25.562 CHRONIC PAIN OF LEFT KNEE: ICD-10-CM

## 2025-04-02 RX ORDER — DULOXETIN HYDROCHLORIDE 30 MG/1
CAPSULE, DELAYED RELEASE ORAL
Qty: 180 CAPSULE | Refills: 1 | Status: SHIPPED | OUTPATIENT
Start: 2025-04-02

## 2025-06-10 DIAGNOSIS — Z12.31 ENCOUNTER FOR SCREENING MAMMOGRAM FOR BREAST CANCER: ICD-10-CM

## 2025-06-13 ENCOUNTER — HOSPITAL ENCOUNTER (OUTPATIENT)
Dept: RADIOLOGY | Facility: HOSPITAL | Age: 40
Discharge: HOME | End: 2025-06-13
Payer: COMMERCIAL

## 2025-06-13 VITALS — HEIGHT: 65 IN | BODY MASS INDEX: 41.15 KG/M2 | WEIGHT: 247 LBS

## 2025-06-13 DIAGNOSIS — Z12.31 ENCOUNTER FOR SCREENING MAMMOGRAM FOR BREAST CANCER: ICD-10-CM

## 2025-06-13 PROCEDURE — 77067 SCR MAMMO BI INCL CAD: CPT

## 2025-06-16 DIAGNOSIS — R92.8 ABNORMAL MAMMOGRAM: Primary | ICD-10-CM

## 2025-07-08 ENCOUNTER — OFFICE VISIT (OUTPATIENT)
Dept: URGENT CARE | Age: 40
End: 2025-07-08
Payer: COMMERCIAL

## 2025-07-08 VITALS
BODY MASS INDEX: 40.27 KG/M2 | HEART RATE: 89 BPM | OXYGEN SATURATION: 98 % | DIASTOLIC BLOOD PRESSURE: 71 MMHG | RESPIRATION RATE: 17 BRPM | SYSTOLIC BLOOD PRESSURE: 112 MMHG | TEMPERATURE: 97.8 F | WEIGHT: 242 LBS

## 2025-07-08 DIAGNOSIS — S43.401A SPRAIN OF RIGHT SHOULDER, UNSPECIFIED SHOULDER SPRAIN TYPE, INITIAL ENCOUNTER: Primary | ICD-10-CM

## 2025-07-08 PROCEDURE — 73030 X-RAY EXAM OF SHOULDER: CPT | Performed by: PHYSICIAN ASSISTANT

## 2025-07-08 PROCEDURE — 99213 OFFICE O/P EST LOW 20 MIN: CPT | Performed by: PHYSICIAN ASSISTANT

## 2025-07-08 PROCEDURE — 96372 THER/PROPH/DIAG INJ SC/IM: CPT | Performed by: PHYSICIAN ASSISTANT

## 2025-07-08 RX ORDER — METHYLPREDNISOLONE 4 MG/1
TABLET ORAL
Qty: 21 TABLET | Refills: 0 | Status: SHIPPED | OUTPATIENT
Start: 2025-07-08 | End: 2025-07-14

## 2025-07-08 RX ORDER — KETOROLAC TROMETHAMINE 10 MG/1
10 TABLET, FILM COATED ORAL EVERY 6 HOURS PRN
Qty: 12 TABLET | Refills: 0 | Status: SHIPPED | OUTPATIENT
Start: 2025-07-08 | End: 2025-07-11

## 2025-07-08 RX ORDER — KETOROLAC TROMETHAMINE 30 MG/ML
30 INJECTION, SOLUTION INTRAMUSCULAR; INTRAVENOUS ONCE
Status: COMPLETED | OUTPATIENT
Start: 2025-07-08 | End: 2025-07-08

## 2025-07-08 RX ORDER — KETOROLAC TROMETHAMINE 30 MG/ML
30 INJECTION, SOLUTION INTRAMUSCULAR; INTRAVENOUS ONCE
Status: DISCONTINUED | OUTPATIENT
Start: 2025-07-08 | End: 2025-07-08

## 2025-07-08 RX ORDER — METHOCARBAMOL 500 MG/1
500 TABLET, FILM COATED ORAL 2 TIMES DAILY
Qty: 14 TABLET | Refills: 0 | Status: SHIPPED | OUTPATIENT
Start: 2025-07-08 | End: 2025-07-15

## 2025-07-08 RX ADMIN — KETOROLAC TROMETHAMINE 30 MG: 30 INJECTION, SOLUTION INTRAMUSCULAR; INTRAVENOUS at 12:49

## 2025-07-08 NOTE — PROGRESS NOTES
"Subjective   Patient ID: Shana Pierce is a 39 y.o. female who presents for Right shoulder pain (Pt states she felt a \"pop\" in her right shoulder while lifting sheet metal at work yesterday).  History of Present Illness  Shana Pierce is a 39 year old female who presents with right shoulder pain following a work-related injury.    She experienced a right shoulder injury yesterday morning at work while lifting a 40-pound flat of sheet metal. She felt a 'pop' in her shoulder during the task, followed by immediate pain that intensified over time. The pain is constant, rated 7 to 8 out of 10, and increases with movement in any direction.    She experiences numbness and tingling in her fingers, and notes that her shoulder feels swollen, with pain radiating up into her neck and down her arm. No specific neck pain is reported.Due to the pain and limited mobility, she required assistance from her 16-year-old to brush and put up her hair this morning.       ROS is negative unless otherwise stated in HPI.       Objective     /71 (BP Location: Left arm, Patient Position: Sitting, BP Cuff Size: Large adult)   Pulse 89   Temp 36.6 °C (97.8 °F) (Oral)   Resp 17   Wt 110 kg (242 lb)   LMP  (LMP Unknown)   SpO2 98%   BMI 40.27 kg/m²        VS: As documented in the triage note and EMR flowsheet from this visit was reviewed  General: Well appearing. No acute distress.   Eyes:  Extraocular movements grossly intact. No scleral icterus.   Head: Atraumatic. Normocephalic.     Neck: No meningismus. No gross masses. Full movement through range of motion  CV: Regular rhythm. No murmurs, rubs, gallops appreciated.   Resp: Clear to auscultation bilaterally. No respiratory distress.    GI: Nontender. Soft. No masses. No rebound, rigidity or guarding.   MSK: Symmetric muscle bulk. No gross step offs or deformities. Limited ROM with overhead flexion. No TTP to the glenohumeral joint or clavicle.   Skin: Warm, dry. No " alysia  Neuro: CN II-VII intact. A&O x3. Speech fluent. Alert. Moving all extremities. Ambulates with normal gait  Psych: Appropriate mood and affect for situation        Point of Care Test & Imaging Results from this visit  No results found for this visit on 07/08/25.   Imaging  XR shoulder right 2+ views  Result Date: 7/8/2025  No acute abnormality seen     MACRO: None   Signed by: Gunnar Rucker 7/8/2025 12:35 PM Dictation workstation:   [id number]      Cardiology, Vascular, and Other Imaging  No other imaging results found for the past 2 days      Diagnostic study results (if any) were reviewed by Corazon Hoffman PA-C.    Assessment/Plan   Allergies, medications, history, and pertinent labs/EKGs/Imaging reviewed by Corazon Hoffman PA-C.     Assessment & Plan  Patient is a 39-year-old female who presents for acute right shoulder pain after living a heavy piece of sheet metal at work.  On examination, she has some limited range of motion with overhead movement.  Notes some numbness, tingling down the right arm.  Good pulses and distal sensation.  Vitals are stable.  Patient administered Toradol for pain control.  X-rays negative.  Suspect muscular strain versus cervical radiculopathy.  Provided prescriptions for Medrol Dosepak, Toradol and Robaxin.  Provided light duty restrictions for work.  Worker's Compensation paperwork completed. Patient informed of the diagnosis.  They are agreeable to the plan as discussed above.  Patient given the opportunity to ask questions.  All of the patient's questions were answered. Given precautions in which to seek attention in the emergency department. Discussed follow up with PCP or other appropriate clinician.      Orders and Diagnoses  Diagnoses and all orders for this visit:  Sprain of right shoulder, unspecified shoulder sprain type, initial encounter  -     XR shoulder right 2+ views; Future  -     ketorolac (Toradol) 10 mg tablet; Take 1 tablet (10 mg) by mouth every  6 hours if needed for moderate pain (4 - 6) for up to 3 days.  -     methocarbamol (Robaxin) 500 mg tablet; Take 1 tablet (500 mg) by mouth 2 times a day for 7 days.  -     ketorolac (Toradol) injection 30 mg  -     methylPREDNISolone (Medrol Dospak) 4 mg tablets; Follow schedule on package instructions        Disposition:  Home      Corazon Hoffman PA-C     This medical note was created with the assistance of artificial intelligence (AI) for documentation purposes. The content has been reviewed and confirmed by the healthcare provider for accuracy and completeness. Patient consented to the use of audio recording and use of AI during their visit.

## 2025-07-10 ASSESSMENT — PROMIS GLOBAL HEALTH SCALE
RATE_AVERAGE_PAIN: 8
CARRYOUT_PHYSICAL_ACTIVITIES: A LITTLE
CARRYOUT_SOCIAL_ACTIVITIES: FAIR
RATE_GENERAL_HEALTH: FAIR
RATE_AVERAGE_FATIGUE: SEVERE
EMOTIONAL_PROBLEMS: OFTEN
RATE_QUALITY_OF_LIFE: FAIR
RATE_MENTAL_HEALTH: POOR
RATE_SOCIAL_SATISFACTION: POOR
RATE_PHYSICAL_HEALTH: FAIR

## 2025-07-11 ENCOUNTER — HOSPITAL ENCOUNTER (OUTPATIENT)
Dept: RADIOLOGY | Facility: HOSPITAL | Age: 40
Discharge: HOME | End: 2025-07-11
Payer: COMMERCIAL

## 2025-07-11 ENCOUNTER — OFFICE VISIT (OUTPATIENT)
Dept: PRIMARY CARE | Facility: CLINIC | Age: 40
End: 2025-07-11
Payer: COMMERCIAL

## 2025-07-11 VITALS
WEIGHT: 247 LBS | BODY MASS INDEX: 41.15 KG/M2 | HEIGHT: 65 IN | HEART RATE: 85 BPM | OXYGEN SATURATION: 98 % | SYSTOLIC BLOOD PRESSURE: 120 MMHG | TEMPERATURE: 98.2 F | DIASTOLIC BLOOD PRESSURE: 80 MMHG | RESPIRATION RATE: 16 BRPM

## 2025-07-11 DIAGNOSIS — R73.09 ELEVATED GLUCOSE: ICD-10-CM

## 2025-07-11 DIAGNOSIS — R92.8 OTHER ABNORMAL AND INCONCLUSIVE FINDINGS ON DIAGNOSTIC IMAGING OF BREAST: ICD-10-CM

## 2025-07-11 DIAGNOSIS — E66.813 CLASS 3 SEVERE OBESITY DUE TO EXCESS CALORIES WITHOUT SERIOUS COMORBIDITY WITH BODY MASS INDEX (BMI) OF 40.0 TO 44.9 IN ADULT: ICD-10-CM

## 2025-07-11 DIAGNOSIS — Z23 ENCOUNTER FOR IMMUNIZATION: ICD-10-CM

## 2025-07-11 DIAGNOSIS — E78.6 HYPOLIPOPROTEINEMIA: Primary | ICD-10-CM

## 2025-07-11 DIAGNOSIS — Z00.00 ROUTINE GENERAL MEDICAL EXAMINATION AT A HEALTH CARE FACILITY: ICD-10-CM

## 2025-07-11 LAB — POC HEMOGLOBIN A1C: 5.1 % (ref 4.2–6.5)

## 2025-07-11 PROCEDURE — 77061 BREAST TOMOSYNTHESIS UNI: CPT | Mod: RT

## 2025-07-11 PROCEDURE — 3008F BODY MASS INDEX DOCD: CPT | Performed by: FAMILY MEDICINE

## 2025-07-11 PROCEDURE — 90471 IMMUNIZATION ADMIN: CPT | Performed by: FAMILY MEDICINE

## 2025-07-11 PROCEDURE — 77065 DX MAMMO INCL CAD UNI: CPT | Mod: RIGHT SIDE | Performed by: STUDENT IN AN ORGANIZED HEALTH CARE EDUCATION/TRAINING PROGRAM

## 2025-07-11 PROCEDURE — 90715 TDAP VACCINE 7 YRS/> IM: CPT | Performed by: FAMILY MEDICINE

## 2025-07-11 PROCEDURE — 83036 HEMOGLOBIN GLYCOSYLATED A1C: CPT | Performed by: FAMILY MEDICINE

## 2025-07-11 PROCEDURE — 77061 BREAST TOMOSYNTHESIS UNI: CPT | Mod: RIGHT SIDE | Performed by: STUDENT IN AN ORGANIZED HEALTH CARE EDUCATION/TRAINING PROGRAM

## 2025-07-11 PROCEDURE — 99213 OFFICE O/P EST LOW 20 MIN: CPT | Performed by: FAMILY MEDICINE

## 2025-07-11 PROCEDURE — 99396 PREV VISIT EST AGE 40-64: CPT | Performed by: FAMILY MEDICINE

## 2025-07-11 ASSESSMENT — PAIN SCALES - GENERAL: PAINLEVEL_OUTOF10: 4

## 2025-07-11 ASSESSMENT — ENCOUNTER SYMPTOMS
DEPRESSION: 0
OCCASIONAL FEELINGS OF UNSTEADINESS: 0
LOSS OF SENSATION IN FEET: 0
GASTROINTESTINAL NEGATIVE: 1
CONSTITUTIONAL NEGATIVE: 1
NEUROLOGICAL NEGATIVE: 1
CARDIOVASCULAR NEGATIVE: 1
MUSCULOSKELETAL NEGATIVE: 1
RESPIRATORY NEGATIVE: 1

## 2025-07-11 NOTE — PROGRESS NOTES
"Subjective   Patient ID: Shana Pierce is a 39 y.o. female who presents for Annual Exam (Pt is here for pe and fbw. ).    HPI father with diabetes along with other family members.     Review of Systems   Constitutional: Negative.    HENT: Negative.     Respiratory: Negative.     Cardiovascular: Negative.    Gastrointestinal: Negative.    Genitourinary: Negative.    Musculoskeletal: Negative.    Neurological: Negative.        Objective   /80   Pulse 85   Temp 36.8 °C (98.2 °F) (Temporal)   Resp 16   Ht 1.651 m (5' 5\")   Wt 112 kg (247 lb)   LMP  (LMP Unknown)   SpO2 98%   BMI 41.10 kg/m²     Physical Exam  Vitals and nursing note reviewed.   Constitutional:       General: She is not in acute distress.  HENT:      Right Ear: Tympanic membrane and ear canal normal.      Left Ear: Tympanic membrane and ear canal normal.      Nose: Nose normal. No rhinorrhea.      Mouth/Throat:      Pharynx: Oropharynx is clear. No oropharyngeal exudate or posterior oropharyngeal erythema.      Comments: Dentition wnl  Eyes:      Extraocular Movements: Extraocular movements intact.      Conjunctiva/sclera: Conjunctivae normal.      Pupils: Pupils are equal, round, and reactive to light.   Neck:      Vascular: No carotid bruit.   Cardiovascular:      Rate and Rhythm: Normal rate and regular rhythm.      Heart sounds: Normal heart sounds. No murmur heard.  Pulmonary:      Breath sounds: Normal breath sounds. No wheezing or rhonchi.   Abdominal:      General: Bowel sounds are normal. There is no distension.      Palpations: Abdomen is soft. There is no mass.      Tenderness: There is no abdominal tenderness. There is no guarding or rebound.      Hernia: No hernia is present.   Musculoskeletal:         General: No swelling or tenderness. Normal range of motion.      Cervical back: Normal range of motion and neck supple.   Lymphadenopathy:      Cervical: No cervical adenopathy.   Skin:     General: Skin is warm.      " Findings: No rash.   Neurological:      General: No focal deficit present.      Mental Status: She is alert.         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

## 2025-07-13 LAB
ALBUMIN SERPL-MCNC: 4.1 G/DL (ref 3.6–5.1)
ALP SERPL-CCNC: 75 U/L (ref 31–125)
ALT SERPL-CCNC: 17 U/L (ref 6–29)
ANION GAP SERPL CALCULATED.4IONS-SCNC: 11 MMOL/L (CALC) (ref 7–17)
AST SERPL-CCNC: 11 U/L (ref 10–30)
BASOPHILS # BLD AUTO: 46 CELLS/UL (ref 0–200)
BASOPHILS NFR BLD AUTO: 0.3 %
BILIRUB SERPL-MCNC: 0.4 MG/DL (ref 0.2–1.2)
BUN SERPL-MCNC: 13 MG/DL (ref 7–25)
CALCIUM SERPL-MCNC: 9 MG/DL (ref 8.6–10.2)
CHLORIDE SERPL-SCNC: 105 MMOL/L (ref 98–110)
CHOLEST SERPL-MCNC: 181 MG/DL
CHOLEST/HDLC SERPL: 3.9 (CALC)
CO2 SERPL-SCNC: 22 MMOL/L (ref 20–32)
CREAT SERPL-MCNC: 0.67 MG/DL (ref 0.5–0.99)
EGFRCR SERPLBLD CKD-EPI 2021: 113 ML/MIN/1.73M2
EOSINOPHIL # BLD AUTO: 15 CELLS/UL (ref 15–500)
EOSINOPHIL NFR BLD AUTO: 0.1 %
ERYTHROCYTE [DISTWIDTH] IN BLOOD BY AUTOMATED COUNT: 13.5 % (ref 11–15)
GLUCOSE SERPL-MCNC: 85 MG/DL (ref 65–99)
HCT VFR BLD AUTO: 48.9 % (ref 35–45)
HCV AB SERPL QL IA: NORMAL
HDLC SERPL-MCNC: 46 MG/DL
HGB BLD-MCNC: 15.7 G/DL (ref 11.7–15.5)
HIV 1+2 AB+HIV1 P24 AG SERPL QL IA: NORMAL
HIV 1+2 AB+HIV1 P24 AG SERPL QL IA: NORMAL
LDLC SERPL CALC-MCNC: 120 MG/DL (CALC)
LYMPHOCYTES # BLD AUTO: 1760 CELLS/UL (ref 850–3900)
LYMPHOCYTES NFR BLD AUTO: 11.5 %
MCH RBC QN AUTO: 31.7 PG (ref 27–33)
MCHC RBC AUTO-ENTMCNC: 32.1 G/DL (ref 32–36)
MCV RBC AUTO: 98.8 FL (ref 80–100)
MONOCYTES # BLD AUTO: 581 CELLS/UL (ref 200–950)
MONOCYTES NFR BLD AUTO: 3.8 %
NEUTROPHILS # BLD AUTO: ABNORMAL CELLS/UL (ref 1500–7800)
NEUTROPHILS NFR BLD AUTO: 84.3 %
NONHDLC SERPL-MCNC: 135 MG/DL (CALC)
PLATELET # BLD AUTO: 254 THOUSAND/UL (ref 140–400)
PMV BLD REES-ECKER: 11.1 FL (ref 7.5–12.5)
POTASSIUM SERPL-SCNC: 4.4 MMOL/L (ref 3.5–5.3)
PROT SERPL-MCNC: 7 G/DL (ref 6.1–8.1)
RBC # BLD AUTO: 4.95 MILLION/UL (ref 3.8–5.1)
SODIUM SERPL-SCNC: 138 MMOL/L (ref 135–146)
TRIGL SERPL-MCNC: 60 MG/DL
TSH SERPL-ACNC: 2.11 MIU/L
WBC # BLD AUTO: 15.3 THOUSAND/UL (ref 3.8–10.8)

## 2025-07-14 DIAGNOSIS — D72.829 LEUKOCYTOSIS, UNSPECIFIED TYPE: ICD-10-CM

## 2025-08-08 ENCOUNTER — EVALUATION (OUTPATIENT)
Dept: PHYSICAL THERAPY | Facility: CLINIC | Age: 40
End: 2025-08-08
Payer: COMMERCIAL

## 2025-08-08 DIAGNOSIS — S43.401D UNSPECIFIED SPRAIN OF RIGHT SHOULDER JOINT, SUBSEQUENT ENCOUNTER: Primary | ICD-10-CM

## 2025-08-08 PROCEDURE — 97110 THERAPEUTIC EXERCISES: CPT | Mod: GP | Performed by: PHYSICAL THERAPIST

## 2025-08-08 PROCEDURE — 97162 PT EVAL MOD COMPLEX 30 MIN: CPT | Mod: GP | Performed by: PHYSICAL THERAPIST

## 2025-08-08 NOTE — PROGRESS NOTES
Physical Therapy Evaluation and Treatment    Patient Name: Shana Pierce  MRN: [id number]  YOB: 1985  Encounter Date: 8/8/2025    Time Entry:  Time Calculation  Start Time: 1323  Stop Time: 1410  Time Calculation (min): 47 min  PT Evaluation Time Entry  PT Evaluation (Moderate) Time Entry: 18  PT Therapeutic Procedures Time Entry  Therapeutic Exercise Time Entry: 26                   Rehab Insurance Information:   Visit Count: 1  POC Visits: 12  Auth Required: Yes  Authorized Visits: 12  Auth Date Range: 07/25/25  Through: 11/25/25        Additional Authorization/Insurance Information: [facility]- Approved C9 on record    Rehab Falls Risk Assessment:  Fall Risk Indicated: Yes  Factors for Moderate Risk For Falls: Fall within last 6 months (Intermittent giving out of the L LE)  Moderate Fall Risk Interventions: Optimize clinic environment ensuring safe and accessible pathways    Problem List Items Addressed This Visit           ICD-10-CM    Unspecified sprain of right shoulder joint, subsequent encounter - Primary S43.401D    Relevant Orders    Follow Up In Physical Therapy       Precautions       Orthosis Precautions Comment: No Upper Extremity Precautions    Subjective   Patient Education  Do you or those around you need extra help because of problems with hearing, speaking, seeing, moving around, or learning?: No  Are you comfortable filing out medical forms?: Yes  Key Learner  Key Learner: Patient  Primary language for learning: English  History of Present Illness  Shana is a 39 y.o. female who reports to physical therapy with a chief concern of R shoulder pain.         Diagnostic tests related to this condition: X-ray.   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  "She felt/heard \"pop\" and had instant pain. She notes that the pain got worse and the second day, she went to Urgent Care. She has been dealing increasing limitations in ADL's and work tasks due to R shoulder pain. She states that she currently does what she can at work with light duty restrictions. She has increased difficulty sleeping and ADL's involving R UE.    Activities of Daily Living  Social history was obtained from Patient.               Previously independent with activities of daily living? Yes     Currently independent with activities of daily living? No  Activities currently needing assistance include Grooming, Bathing, and Dressing - upper body.        Previously independent with instrumental activities of daily living? Yes     Currently independent with instrumental activities of daily living? Yes              Pain     Patient reports a current pain level of 7/10. Pain at best is reported as 4/10. Pain at worst is reported as 10/10.   Location: R anterior shoulder  Clinical Progression (since onset): Worsening  Pain Qualities: Other (Comment), Sharp, Knife-like  Other Pain Qualities: \"Numbness/Tingling\" down arm to thumb and digits 4-5 R UE  Pain-Relieving Factors: Change in position, Other (Comment)  Other Pain-Relieving Factors: TEN's Unit  Pain-Aggravating Factors: Reaching, Sleeping, Lifting, Other (Comment)  Other Pain-Aggravating Factors: Carrying         Review of Systems    Additional Review of Systems Details: PMH: Left ACL-R (allograft) 9/2023, revision to fix anchor and clean bone spurs 5/2024- still doing rehab with [facility]; Asthma; Headaches/Migraines  Additional Red Flag Details: None    Treatment History            Medications in Chart    Living Arrangements  Living Situation  Housing: Home independently  Living Arrangements: Family members      Education/Employment       Employment Status: Employed full-time  Patient reports condition impacts: ability to work    [employer] -  " [employer] ([employer]); Light Duty Restrictions - no lifting/carrying >20 lbs all positions, as well as limited with OH and front/lateral reach       Objective   Qdash 11 Questions: 41  Posture  Patient presents with a Forward head position. Increased thoracic kyphosis is observed.     Bilateral scapulae are: Protracted              Shoulder Observations     Guarded and protective movements of the R UE while in the clinic        Shoulder Palpation  Right Shoulder Palpation  Abnormal: Bony Prominence/Bursa  Right Shoulder Bony Prominence/Bursa Palpation Observations: Anterior shoulder R diffusely                   Shoulder Range of Motion  Right Shoulder   Active (deg) Passive (deg) Pain   Flexion 84 95 Yes   Extension         Scaption         ABduction 70 85 Yes   ADduction 65 72     Horizontal ABduction         Horizontal ADduction         External Rotation (Shoulder ABducted 0 degrees)         External Rotation (Shoulder ABducted 45 degrees)         External Rotation (Shoulder ABducted 90 degrees) 18 30 Yes   Internal Rotation (Shoulder ABducted 0 degrees)         Internal Rotation (Shoulder ABducted 45 degrees)         Internal Rotation (Shoulder ABducted 90 degrees) 15 20 Yes     Left Shoulder   Active (deg) Passive (deg) Pain   Flexion 165       Extension         Scaption         ABduction 165       ADduction         Horizontal ABduction         Horizontal ADduction         External Rotation (Shoulder ABducted 0 degrees)         External Rotation (Shoulder ABducted 45 degrees)         External Rotation (Shoulder ABducted 90 degrees) 95       Internal Rotation (Shoulder ABducted 0 degrees)         Internal Rotation (Shoulder ABducted 45 degrees)         Internal Rotation (Shoulder ABducted 90 degrees) 65              Elbow/Forearm Range of Motion   WFL: Right Elbow Flexion, Right Elbow Extension, Left Elbow Flexion, and Left Elbow Extension              Shoulder Strength - Planes of Motion   Right  Strength Right Pain Left Strength Left Pain   Flexion 4   5     Extension     5     ABduction 4-   5     ADduction           Horizontal ABduction           Horizontal ADduction           Internal Rotation 0° 4   5     Internal Rotation 90°           External Rotation 0° 3+   5     External Rotation 90°             Elbow Strength   Right Strength Right Pain Left Strength Left Pain   Flexion (C6) 5   5     Extension (C7)                       Shoulder Special Tests  Shoulder Stability Tests  Positive: Right Anterior Load and Shift and Right Apprehension  Shoulder Labral Tests  Positive: Right Biceps Load II           Shoulder Joint Mobility  Right Shoulder Mobility  Hypomobile: Posterior Capsule Mobility (Pain), Inferior Capsule Mobility (Pain), and Long Axis Distraction Mobility (Pain)                          Activities   Therapeutic Exercise  Therapeutic Exercise Activity 1: HEP Education and demonstration with sets and reps as noted. Pt with good understanding and demonstration.  Therapeutic Exercise Activity 2: PROM R shoulder - all  planes                                                       Assessment/Plan   Assessment  Shana presents with a condition of Moderate complexity.   Presentation of Symptoms: Stable  Will Comorbidities Impact Care: No       Personal Factors Affecting Prognosis: Other (Comment)  Other Personal Factors Affecting Prognosis: Smoking    Patient Goal for Therapy (PT): Reduce pain and gain movement for return to ADL's and work without limitations or restrictions  Prognosis: Good  Prognosis Details: SINSS: Severity: High, Irritability: High, Nature: MSK R shoulder, Stage: Sub-Acute   Assessment Details: Patient is 21 y.o. year old who presents to physical therapy with signs and symptoms consistent with R shoulder pain. Patient has decreased ROM and strength limiting functional mobility and ADLs. Pt would benefit from skilled physical therapy in order to address the stated deficits and  return to daily tasks with reduced pain and improved function.  Patient has difficulty with ADLs and work tasks involving lifting and carrying of objects as well as prolonged movements or reaching in multiple directions.  These limitations significantly impact overall quality of life and ability to perform her job at full duty.  Initiated range of motion activities today in the clinic and for HEP and will assess benefit overall.  Will look to progress and advance strengthening while patient waits for MRI next Friday.     Goals  Active       STG       Start:  08/08/25    Expected End:  08/29/25       1) Patient will improve Quick Dash score by 10% to show an improvement in function.  2) Patient will show an improvement in shoulder flexion ROM in order to allow for improved ability to perform household chores.  3) Patient will be able to perform ADLs without pain exceeding 6/10 on the VAS.  4) Patient will be independent with HEP in 3 visits in order to allow for improved function with home and community tasks.           LTG       Start:  08/08/25    Expected End:  09/19/25       1) Patient will improve Quick Dash score to </=15% to show an improvement in function.  2) Patient will improve scapular and shld strength to 5/5 in order to reduce compensatory guarding in upper trapezius and greater functional use of upper extremities.   3) Patient will be able to perform ADLs without pain exceeding 5/10 on the VAS.  4) Patient will return to all work related tasks to improve QOL.            Education  Education was done with Patient. The patient's learning style includes Demonstration. The patient Demonstrates understanding.         Access Code: [email or web address] URL: [email or web address] Date: 08/08/2025 Prepared by: Juan Godinez Exercises - Seated Scapular Retraction  - 1-2 x daily - 7 x weekly - 3 sets - 10 reps - Circular Shoulder Pendulum with Table Support  - 1-2 x daily - 7 x weekly - 3 sets - 10 reps  - Horizontal Shoulder Pendulum with Table Support  - 1-2 x daily - 7 x weekly - 3 sets - 10 reps - Flexion-Extension Shoulder Pendulum with Table Support (Mirrored)  - 1-2 x daily - 7 x weekly - 3 sets - 10 reps - Seated Shoulder Flexion Towel Slide at Table Top  - 1-2 x daily - 7 x weekly - 3 sets - 10 reps    Plan  From a physical therapy perspective, the patient would benefit from: Skilled Rehab Services    Planned therapy interventions include: Therapeutic exercise, Therapeutic activities, Neuromuscular re-education, Manual therapy, and ADLs/IADLs.    Planned modalities to include: Biofeedback, Electrical stimulation - passive/unattended, Vasopneumatic pump, and Cryotherapy (cold pack).        Visit Frequency: 2 times Per Week for 6 Weeks.       This plan was discussed with Patient.   Discussion participants: Agreed Upon Plan of Care

## 2025-08-13 ENCOUNTER — TREATMENT (OUTPATIENT)
Dept: PHYSICAL THERAPY | Facility: CLINIC | Age: 40
End: 2025-08-13
Payer: COMMERCIAL

## 2025-08-13 DIAGNOSIS — S43.401D UNSPECIFIED SPRAIN OF RIGHT SHOULDER JOINT, SUBSEQUENT ENCOUNTER: Primary | ICD-10-CM

## 2025-08-13 PROCEDURE — 97110 THERAPEUTIC EXERCISES: CPT | Mod: GP,CQ

## 2025-08-13 PROCEDURE — 97140 MANUAL THERAPY 1/> REGIONS: CPT | Mod: GP,CQ

## 2025-08-14 DIAGNOSIS — D72.829 LEUKOCYTOSIS, UNSPECIFIED TYPE: ICD-10-CM

## 2025-08-15 ENCOUNTER — HOSPITAL ENCOUNTER (OUTPATIENT)
Dept: RADIOLOGY | Facility: HOSPITAL | Age: 40
Discharge: HOME | End: 2025-08-15
Payer: COMMERCIAL

## 2025-08-15 DIAGNOSIS — S43.401A UNSPECIFIED SPRAIN OF RIGHT SHOULDER JOINT, INITIAL ENCOUNTER: ICD-10-CM

## 2025-08-15 PROCEDURE — 73221 MRI JOINT UPR EXTREM W/O DYE: CPT | Mod: RT

## 2025-08-21 ENCOUNTER — APPOINTMENT (OUTPATIENT)
Dept: PHYSICAL THERAPY | Facility: CLINIC | Age: 40
End: 2025-08-21
Payer: COMMERCIAL

## 2025-08-21 DIAGNOSIS — S43.401D UNSPECIFIED SPRAIN OF RIGHT SHOULDER JOINT, SUBSEQUENT ENCOUNTER: Primary | ICD-10-CM

## 2025-08-27 ENCOUNTER — APPOINTMENT (OUTPATIENT)
Dept: PHYSICAL THERAPY | Facility: CLINIC | Age: 40
End: 2025-08-27
Payer: COMMERCIAL

## 2025-08-27 DIAGNOSIS — S43.401D UNSPECIFIED SPRAIN OF RIGHT SHOULDER JOINT, SUBSEQUENT ENCOUNTER: Primary | ICD-10-CM

## 2025-08-29 ENCOUNTER — APPOINTMENT (OUTPATIENT)
Dept: PHYSICAL THERAPY | Facility: CLINIC | Age: 40
End: 2025-08-29
Payer: COMMERCIAL

## 2025-08-29 DIAGNOSIS — S43.401D UNSPECIFIED SPRAIN OF RIGHT SHOULDER JOINT, SUBSEQUENT ENCOUNTER: Primary | ICD-10-CM

## 2025-09-05 ENCOUNTER — TREATMENT (OUTPATIENT)
Dept: PHYSICAL THERAPY | Facility: CLINIC | Age: 40
End: 2025-09-05
Payer: COMMERCIAL

## 2025-09-05 DIAGNOSIS — S43.401D UNSPECIFIED SPRAIN OF RIGHT SHOULDER JOINT, SUBSEQUENT ENCOUNTER: Primary | ICD-10-CM

## 2025-09-05 PROCEDURE — 97110 THERAPEUTIC EXERCISES: CPT | Mod: GP | Performed by: PHYSICAL THERAPIST

## 2025-09-05 PROCEDURE — 97140 MANUAL THERAPY 1/> REGIONS: CPT | Mod: GP | Performed by: PHYSICAL THERAPIST

## (undated) DEVICE — SUTURE, CTD, VICRYL, 2-0, UND, BR, CT-2

## (undated) DEVICE — BANDAGE, ELASTIC, SELF-CLOSE, 6 IN X 10 YD, STERILE, LF

## (undated) DEVICE — EXCAL 4MM X 13CM SINGLE

## (undated) DEVICE — APOLLO RFÏ¿½ I90, ASPIRATING ABLATOR 90Ï¿½

## (undated) DEVICE — Device

## (undated) DEVICE — BANDAGE, FLEX-WRAP, 4 IN X 5 YD, TAN, STERILE

## (undated) DEVICE — APPLICATOR, CHLORAPREP, W/ORANGE TINT, 26ML

## (undated) DEVICE — SUTURE, VICRYL, 0, 27 IN, CT-2, UNDYED

## (undated) DEVICE — SPONGE, GAUZE, XRAY DECT, 16 PLY, 4 X 4, W/MASTER DMT,STERILE

## (undated) DEVICE — BAG, DECANTER

## (undated) DEVICE — CUFF, TOURNIQUET, 34 X 4, DUAL PORT/SNGL BLADDER, DISP, LF

## (undated) DEVICE — TUBING, ARTHROSCOPIC INFLOW, 10K

## (undated) DEVICE — SUTURE, VICRYL 3-0, PRECISION POINT, PS-2 UNDYED 27 INCH

## (undated) DEVICE — TUBING, PUMP MAIN 16FT STERILE

## (undated) DEVICE — CUFF, TOURNIQUET, 30 X 4, SNGL PORT/SNGL BLADDER, DISP, LF

## (undated) DEVICE — PADDING, WEBRIL, UNDERCAST, STERILE, 4 IN

## (undated) DEVICE — SYRINGE, 60 CC, IRRIGATION, BULB, CONTRO-BULB, PAPER POUCH

## (undated) DEVICE — DRAPE, SHEET, U, IMPERVIOUS, STERI DRAPE, 47 X 70 IN, DISPOSABLE, PLASTIC, STERILE

## (undated) DEVICE — DRESSING, NON-ADHERENT, 3 X 3 IN, STERILE

## (undated) DEVICE — TUBING, SUCTION, NON-CONDUCTIVE, W/CONNECT,.25 IN X 12 FT, STERILE, LF

## (undated) DEVICE — PADDING, WEBRIL, UNDERCAST, STERILE, 6 IN

## (undated) DEVICE — DRAPE SHEET, UTILITY, 26 X 15, W/ TAPE, STERILE